# Patient Record
Sex: MALE | Race: WHITE | NOT HISPANIC OR LATINO | Employment: FULL TIME | URBAN - METROPOLITAN AREA
[De-identification: names, ages, dates, MRNs, and addresses within clinical notes are randomized per-mention and may not be internally consistent; named-entity substitution may affect disease eponyms.]

---

## 2018-11-07 ENCOUNTER — OFFICE VISIT (OUTPATIENT)
Dept: NEUROSURGERY | Facility: CLINIC | Age: 43
End: 2018-11-07
Payer: COMMERCIAL

## 2018-11-07 VITALS
DIASTOLIC BLOOD PRESSURE: 92 MMHG | SYSTOLIC BLOOD PRESSURE: 137 MMHG | TEMPERATURE: 99 F | HEART RATE: 100 BPM | WEIGHT: 205 LBS | HEIGHT: 72 IN | BODY MASS INDEX: 27.77 KG/M2

## 2018-11-07 DIAGNOSIS — F32.A DEPRESSION, UNSPECIFIED DEPRESSION TYPE: ICD-10-CM

## 2018-11-07 DIAGNOSIS — M51.34 DDD (DEGENERATIVE DISC DISEASE), THORACIC: Primary | ICD-10-CM

## 2018-11-07 DIAGNOSIS — M54.9 MID BACK PAIN: ICD-10-CM

## 2018-11-07 DIAGNOSIS — G89.4 CHRONIC PAIN SYNDROME: ICD-10-CM

## 2018-11-07 PROCEDURE — 99999 PR PBB SHADOW E&M-EST. PATIENT-LVL III: CPT | Mod: PBBFAC,,, | Performed by: NEUROLOGICAL SURGERY

## 2018-11-07 PROCEDURE — 3008F BODY MASS INDEX DOCD: CPT | Mod: CPTII,S$GLB,, | Performed by: NEUROLOGICAL SURGERY

## 2018-11-07 PROCEDURE — 99205 OFFICE O/P NEW HI 60 MIN: CPT | Mod: S$GLB,,, | Performed by: NEUROLOGICAL SURGERY

## 2018-11-07 RX ORDER — FLUOXETINE HYDROCHLORIDE 20 MG/1
20 CAPSULE ORAL DAILY
COMMUNITY
End: 2023-04-07

## 2018-11-07 RX ORDER — ARIPIPRAZOLE 2 MG/1
2 TABLET ORAL DAILY
COMMUNITY
End: 2021-04-11 | Stop reason: SDUPTHER

## 2018-11-07 RX ORDER — HYDROCODONE BITARTRATE AND ACETAMINOPHEN 10; 325 MG/1; MG/1
1 TABLET ORAL EVERY 12 HOURS PRN
COMMUNITY

## 2018-11-07 NOTE — LETTER
November 7, 2018      Ann-Marie Smith MD  [address]  [facility] [address]           [place] [facility]  [address] [facility] [address] [place] [address]  Phone: [phone]  Fax: [phone]          Patient: Raúl Hastings   MR Number: [id number]   YOB: 1975   Date of Visit: 11/7/2018       Dear Dr. Ann-Marie Smith:    Thank you for referring Raúl Hastings to me for evaluation. Attached you will find relevant portions of my assessment and plan of care.    If you have questions, please do not hesitate to call me. I look forward to following Raúl Hastings along with you.    Sincerely,    Logan Christina MD    Enclosure  CC:  No Recipients    If you would like to receive this communication electronically, please contact [email or web address] or [phone] to request more information on [facility] access.    For providers and/or their staff who would like to refer a patient to [facility], please contact us through our one-stop-shop provider referral line, [facility] , at [phone].    If you feel you have received this communication in error or would no longer like to receive these types of communications, please e-mail [email or web address]

## 2018-11-07 NOTE — PROGRESS NOTES
Neurosurgery Outpatient Follow Up    Patient ID: Raúl Hastings is a 43 y.o. male.    No chief complaint on file.          Review of Systems   Constitutional: Negative for activity change, appetite change, chills, fever and unexpected weight change.   HENT: Negative for tinnitus, trouble swallowing and voice change.    Respiratory: Negative for apnea, cough, chest tightness and shortness of breath.    Cardiovascular: Negative for chest pain and palpitations.   Gastrointestinal: Negative for constipation, diarrhea, nausea and vomiting.   Genitourinary: Negative for difficulty urinating, dysuria, frequency and urgency.   Musculoskeletal: Positive for arthralgias, back pain and myalgias. Negative for gait problem, neck pain and neck stiffness.   Skin: Negative for wound.   Neurological: Negative for dizziness, tremors, seizures, facial asymmetry, speech difficulty, weakness, light-headedness, numbness and headaches.   Psychiatric/Behavioral: Positive for dysphoric mood and sleep disturbance. Negative for confusion and decreased concentration.       Past Medical History:   Diagnosis Date    Degenerated intervertebral disc      Social History     Socioeconomic History    Marital status:      Spouse name: Not on file    Number of children: Not on file    Years of education: Not on file    Highest education level: Not on file   Social Needs    Financial resource strain: Not on file    Food insecurity - worry: Not on file    Food insecurity - inability: Not on file    Transportation needs - medical: Not on file    Transportation needs - non-medical: Not on file   Occupational History    Not on file   Tobacco Use    Smoking status: Never Smoker    Smokeless tobacco: Never Used   Substance and Sexual Activity    Alcohol use: No    Drug use: No    Sexual activity: Yes     Partners: Male, Female   Other Topics Concern    Not on file   Social History Narrative    Not on file     Family History   Family  history unknown: Yes     Review of patient's allergies indicates:  No Known Allergies    Current Outpatient Medications:     ARIPiprazole (ABILIFY) 2 MG Tab, Take 2 mg by mouth once daily., Disp: , Rfl:     FLUoxetine (PROZAC) 20 MG capsule, Take 20 mg by mouth once daily., Disp: , Rfl:     HYDROcodone-acetaminophen (NORCO)  mg per tablet, Take 1 tablet by mouth every 12 (twelve) hours as needed for Pain., Disp: , Rfl:     selegiline (EMSAM) 6 mg/24 hr, Place 1 patch onto the skin once daily., Disp: , Rfl:   Blood pressure (!) 137/92, pulse 100, temperature 98.8 °F (37.1 °C), height 6' (1.829 m), weight 93 kg (205 lb).      Neurologic Exam     Mental Status   Oriented to person, place, and time.   Speech: speech is normal     Cranial Nerves     CN III, IV, VI   Pupils are equal, round, and reactive to light.    Motor Exam     Strength   Strength 5/5 throughout.     Gait, Coordination, and Reflexes     Reflexes   Right brachioradialis: 2+  Left brachioradialis: 2+  Right biceps: 2+  Left biceps: 2+  Right triceps: 2+  Left triceps: 2+  Right patellar: 2+  Left patellar: 2+  Right achilles: 2+  Left achilles: 2+      Physical Exam   Constitutional: He is oriented to person, place, and time. He appears well-developed and well-nourished.   HENT:   Head: Normocephalic and atraumatic.   Eyes: Pupils are equal, round, and reactive to light.   Neck: Normal range of motion. Neck supple.   Cardiovascular: Normal pulses.   Pulmonary/Chest: Effort normal.   Musculoskeletal: Normal range of motion. He exhibits no edema.   Neurological: He is oriented to person, place, and time. He has normal strength. He displays no atrophy, no tremor and normal reflexes. No cranial nerve deficit or sensory deficit. He exhibits normal muscle tone. He displays no seizure activity. Coordination and gait normal. GCS eye subscore is 4. GCS verbal subscore is 5. GCS motor subscore is 6. He displays no Babinski's sign on the right side.    Reflex Scores:       Tricep reflexes are 2+ on the right side and 2+ on the left side.       Bicep reflexes are 2+ on the right side and 2+ on the left side.       Brachioradialis reflexes are 2+ on the right side and 2+ on the left side.       Patellar reflexes are 2+ on the right side and 2+ on the left side.       Achilles reflexes are 2+ on the right side and 2+ on the left side.  Skin: Skin is warm, dry and intact.   Psychiatric: He has a normal mood and affect. His speech is normal and behavior is normal. Judgment and thought content normal.   Nursing note and vitals reviewed.      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 He has seen Dr. Krueger  and underwent a laminectomy at right T11-12 and T12-L1 followed by multiple BRIEN for stenosis. He developed mid back pain in 2002 with radiation into the ribcage. The pain is worst in day when he sits and is relieved by laying down. There pain is alleviated by posture. He has regular BRIEN with diminishing duration of relief. He has undergone physical therapy with no significant changes.     His Oswestry disability index score is 42% and his PHQ screening is 27 indicating severe depression treated unsuccessfully with Abilify and Prozac.    On examination he is a well-appearing  male with no neurological deficits. He does have a depressed mood and anxious affect.     I have reviewed the recent thoracic and lumbar MRI which shows T11-12 and T12-L1 spondylosis with facet hypertrophy causing mild lateral recess stenosis bilaterally. There is also some degenerative disc disease at L5/S1, but rest of the spine looks quite healthy for his age.     I have showed the images to the patient and discussed the natural history, the pathology, and the treatment options. I believe his primary concern is mid back pain. Given his age, absence of clinical deficits and MRI findings the optimal treatment  would consist of psychiatric treatment. His disability index score is totally our of proportion with his mild spinal disease. However the spinal diagnosis is correct and he will benefits somewhat from a fusion since the symptoms are posture dependant. He can undergo redo decompression with wide facetectomies, reinforced with posterior fusion from T11 to L1.  Given his mental health however I am not optimistic of marked improvement and would demand an improved PHQ-9 score prior to consideration for surgery.    Visit Diagnosis:  DDD (degenerative disc disease), thoracic    Depression, unspecified depression type    Mid back pain    Chronic pain syndrome

## 2018-11-27 PROBLEM — E29.1 TESTICULAR HYPOFUNCTION: Status: ACTIVE | Noted: 2018-11-27

## 2021-03-09 DIAGNOSIS — G47.19 EXCESSIVE DAYTIME SLEEPINESS: ICD-10-CM

## 2021-03-09 DIAGNOSIS — R06.83 SNORING: ICD-10-CM

## 2021-03-09 DIAGNOSIS — G47.419 NARCOLEPSY: Primary | ICD-10-CM

## 2021-03-09 DIAGNOSIS — I10 HTN (HYPERTENSION): ICD-10-CM

## 2021-03-31 ENCOUNTER — OFFICE VISIT (OUTPATIENT)
Dept: PAIN MEDICINE | Facility: CLINIC | Age: 46
End: 2021-03-31
Payer: COMMERCIAL

## 2021-03-31 VITALS
HEART RATE: 83 BPM | DIASTOLIC BLOOD PRESSURE: 79 MMHG | TEMPERATURE: 98 F | WEIGHT: 231.06 LBS | SYSTOLIC BLOOD PRESSURE: 129 MMHG | HEIGHT: 72 IN | RESPIRATION RATE: 18 BRPM | OXYGEN SATURATION: 95 % | BODY MASS INDEX: 31.3 KG/M2

## 2021-03-31 DIAGNOSIS — G89.4 CHRONIC PAIN DISORDER: ICD-10-CM

## 2021-03-31 DIAGNOSIS — M51.34 DDD (DEGENERATIVE DISC DISEASE), THORACIC: Primary | ICD-10-CM

## 2021-03-31 DIAGNOSIS — M48.04 SPINAL STENOSIS, THORACIC REGION: ICD-10-CM

## 2021-03-31 DIAGNOSIS — M96.1 POSTLAMINECTOMY SYNDROME, THORACIC: ICD-10-CM

## 2021-03-31 DIAGNOSIS — M54.14 THORACIC RADICULOPATHY: ICD-10-CM

## 2021-03-31 PROCEDURE — 99999 PR PBB SHADOW E&M-EST. PATIENT-LVL IV: CPT | Mod: PBBFAC,,, | Performed by: ANESTHESIOLOGY

## 2021-03-31 PROCEDURE — 99204 OFFICE O/P NEW MOD 45 MIN: CPT | Mod: S$GLB,,, | Performed by: ANESTHESIOLOGY

## 2021-03-31 PROCEDURE — 99204 PR OFFICE/OUTPT VISIT, NEW, LEVL IV, 45-59 MIN: ICD-10-PCS | Mod: S$GLB,,, | Performed by: ANESTHESIOLOGY

## 2021-03-31 PROCEDURE — 3008F BODY MASS INDEX DOCD: CPT | Mod: CPTII,S$GLB,, | Performed by: ANESTHESIOLOGY

## 2021-03-31 PROCEDURE — 1125F AMNT PAIN NOTED PAIN PRSNT: CPT | Mod: S$GLB,,, | Performed by: ANESTHESIOLOGY

## 2021-03-31 PROCEDURE — 99999 PR PBB SHADOW E&M-EST. PATIENT-LVL IV: ICD-10-PCS | Mod: PBBFAC,,, | Performed by: ANESTHESIOLOGY

## 2021-03-31 PROCEDURE — 3008F PR BODY MASS INDEX (BMI) DOCUMENTED: ICD-10-PCS | Mod: CPTII,S$GLB,, | Performed by: ANESTHESIOLOGY

## 2021-03-31 PROCEDURE — 1125F PR PAIN SEVERITY QUANTIFIED, PAIN PRESENT: ICD-10-PCS | Mod: S$GLB,,, | Performed by: ANESTHESIOLOGY

## 2021-03-31 RX ORDER — CICLOPIROX 1 G/100ML
SHAMPOO TOPICAL
COMMUNITY
End: 2023-04-07

## 2021-03-31 RX ORDER — VALACYCLOVIR HYDROCHLORIDE 1 G/1
TABLET, FILM COATED ORAL
COMMUNITY

## 2021-03-31 RX ORDER — ARIPIPRAZOLE 2 MG/1
1 TABLET ORAL DAILY
COMMUNITY

## 2021-03-31 RX ORDER — DEXTROAMPHETAMINE SULFATE, DEXTROAMPHETAMINE SACCHARATE, AMPHETAMINE SULFATE AND AMPHETAMINE ASPARTATE 5; 5; 5; 5 MG/1; MG/1; MG/1; MG/1
CAPSULE, EXTENDED RELEASE ORAL 2 TIMES DAILY
COMMUNITY
Start: 2021-01-06

## 2021-03-31 RX ORDER — DILTIAZEM HYDROCHLORIDE 240 MG/1
240 CAPSULE, COATED, EXTENDED RELEASE ORAL DAILY
COMMUNITY
Start: 2021-03-23 | End: 2023-04-07

## 2021-03-31 RX ORDER — SILDENAFIL CITRATE 20 MG/1
TABLET ORAL
COMMUNITY
Start: 2021-02-28

## 2021-03-31 RX ORDER — ARIPIPRAZOLE 2 MG/1
1 TABLET ORAL DAILY
COMMUNITY
End: 2021-04-11 | Stop reason: SDUPTHER

## 2021-03-31 RX ORDER — HYDROCODONE POLISTIREX AND CHLORPHENIRAMINE POLISTIREX 10; 8 MG/5ML; MG/5ML
SUSPENSION, EXTENDED RELEASE ORAL
COMMUNITY
End: 2023-04-07

## 2021-03-31 RX ORDER — AZELASTINE 1 MG/ML
SPRAY, METERED NASAL
COMMUNITY
End: 2023-04-07

## 2021-04-12 ENCOUNTER — TELEPHONE (OUTPATIENT)
Dept: PAIN MEDICINE | Facility: CLINIC | Age: 46
End: 2021-04-12

## 2021-04-26 ENCOUNTER — TELEPHONE (OUTPATIENT)
Dept: PAIN MEDICINE | Facility: CLINIC | Age: 46
End: 2021-04-26

## 2021-04-29 ENCOUNTER — PATIENT MESSAGE (OUTPATIENT)
Dept: RESEARCH | Facility: HOSPITAL | Age: 46
End: 2021-04-29

## 2021-04-29 ENCOUNTER — TELEPHONE (OUTPATIENT)
Dept: PAIN MEDICINE | Facility: CLINIC | Age: 46
End: 2021-04-29

## 2021-09-07 ENCOUNTER — TELEPHONE (OUTPATIENT)
Dept: SLEEP MEDICINE | Facility: OTHER | Age: 46
End: 2021-09-07

## 2021-09-19 ENCOUNTER — CLINICAL SUPPORT (OUTPATIENT)
Dept: URGENT CARE | Facility: CLINIC | Age: 46
End: 2021-09-19
Payer: COMMERCIAL

## 2021-09-19 DIAGNOSIS — Z11.52 ENCOUNTER FOR SCREENING LABORATORY TESTING FOR COVID-19 VIRUS: Primary | ICD-10-CM

## 2021-09-19 PROCEDURE — U0005 INFEC AGEN DETEC AMPLI PROBE: HCPCS | Performed by: EMERGENCY MEDICINE

## 2021-09-19 PROCEDURE — 99211 OFF/OP EST MAY X REQ PHY/QHP: CPT | Mod: S$GLB,CS,, | Performed by: EMERGENCY MEDICINE

## 2021-09-19 PROCEDURE — U0003 INFECTIOUS AGENT DETECTION BY NUCLEIC ACID (DNA OR RNA); SEVERE ACUTE RESPIRATORY SYNDROME CORONAVIRUS 2 (SARS-COV-2) (CORONAVIRUS DISEASE [COVID-19]), AMPLIFIED PROBE TECHNIQUE, MAKING USE OF HIGH THROUGHPUT TECHNOLOGIES AS DESCRIBED BY CMS-2020-01-R: HCPCS | Performed by: EMERGENCY MEDICINE

## 2021-09-19 PROCEDURE — 99211 PR OFFICE/OUTPT VISIT, EST, LEVL I: ICD-10-PCS | Mod: S$GLB,CS,, | Performed by: EMERGENCY MEDICINE

## 2021-09-20 ENCOUNTER — TELEPHONE (OUTPATIENT)
Dept: URGENT CARE | Facility: CLINIC | Age: 46
End: 2021-09-20

## 2021-09-20 LAB
SARS-COV-2 RNA RESP QL NAA+PROBE: NOT DETECTED
SARS-COV-2- CYCLE NUMBER: NORMAL

## 2021-09-21 ENCOUNTER — HOSPITAL ENCOUNTER (OUTPATIENT)
Dept: SLEEP MEDICINE | Facility: OTHER | Age: 46
Discharge: HOME | End: 2021-09-21
Attending: PSYCHIATRY & NEUROLOGY
Payer: COMMERCIAL

## 2021-09-21 DIAGNOSIS — G47.419 NARCOLEPSY: ICD-10-CM

## 2021-09-21 DIAGNOSIS — G47.33 OSA (OBSTRUCTIVE SLEEP APNEA): Primary | ICD-10-CM

## 2021-09-21 PROCEDURE — 95810 POLYSOM 6/> YRS 4/> PARAM: CPT | Mod: 26,,, | Performed by: INTERNAL MEDICINE

## 2021-09-21 PROCEDURE — 95810 PR POLYSOMNOGRAPHY, 4 OR MORE: ICD-10-PCS | Mod: 26,,, | Performed by: INTERNAL MEDICINE

## 2021-09-21 PROCEDURE — 95810 POLYSOM 6/> YRS 4/> PARAM: CPT

## 2024-04-29 ENCOUNTER — HOSPITAL ENCOUNTER (INPATIENT)
Facility: HOSPITAL | Age: 49
LOS: 1 days | Discharge: HOME | DRG: 887 | End: 2024-05-02
Attending: STUDENT IN AN ORGANIZED HEALTH CARE EDUCATION/TRAINING PROGRAM | Admitting: HOSPITALIST
Payer: COMMERCIAL

## 2024-04-29 DIAGNOSIS — R79.89 ACTH ELEVATION: ICD-10-CM

## 2024-04-29 DIAGNOSIS — R42 DIZZINESS: ICD-10-CM

## 2024-04-29 DIAGNOSIS — I95.9 HYPOTENSION: ICD-10-CM

## 2024-04-29 DIAGNOSIS — R51.9 NONINTRACTABLE HEADACHE, UNSPECIFIED CHRONICITY PATTERN, UNSPECIFIED HEADACHE TYPE: Primary | ICD-10-CM

## 2024-04-29 DIAGNOSIS — R53.1 WEAKNESS: ICD-10-CM

## 2024-04-29 DIAGNOSIS — R07.9 CHEST PAIN: ICD-10-CM

## 2024-04-29 PROCEDURE — 80053 COMPREHEN METABOLIC PANEL: CPT | Performed by: STUDENT IN AN ORGANIZED HEALTH CARE EDUCATION/TRAINING PROGRAM

## 2024-04-29 PROCEDURE — 99285 EMERGENCY DEPT VISIT HI MDM: CPT | Mod: 25

## 2024-04-29 PROCEDURE — 82533 TOTAL CORTISOL: CPT | Performed by: STUDENT IN AN ORGANIZED HEALTH CARE EDUCATION/TRAINING PROGRAM

## 2024-04-29 PROCEDURE — 84443 ASSAY THYROID STIM HORMONE: CPT | Performed by: STUDENT IN AN ORGANIZED HEALTH CARE EDUCATION/TRAINING PROGRAM

## 2024-04-29 PROCEDURE — 93010 ELECTROCARDIOGRAM REPORT: CPT | Mod: ,,, | Performed by: GENERAL PRACTICE

## 2024-04-29 PROCEDURE — 83880 ASSAY OF NATRIURETIC PEPTIDE: CPT | Performed by: STUDENT IN AN ORGANIZED HEALTH CARE EDUCATION/TRAINING PROGRAM

## 2024-04-29 PROCEDURE — 84484 ASSAY OF TROPONIN QUANT: CPT | Performed by: STUDENT IN AN ORGANIZED HEALTH CARE EDUCATION/TRAINING PROGRAM

## 2024-04-29 PROCEDURE — 82024 ASSAY OF ACTH: CPT | Performed by: STUDENT IN AN ORGANIZED HEALTH CARE EDUCATION/TRAINING PROGRAM

## 2024-04-29 PROCEDURE — 93005 ELECTROCARDIOGRAM TRACING: CPT | Performed by: GENERAL PRACTICE

## 2024-04-29 PROCEDURE — 83605 ASSAY OF LACTIC ACID: CPT | Performed by: STUDENT IN AN ORGANIZED HEALTH CARE EDUCATION/TRAINING PROGRAM

## 2024-04-29 PROCEDURE — 85025 COMPLETE CBC W/AUTO DIFF WBC: CPT | Performed by: STUDENT IN AN ORGANIZED HEALTH CARE EDUCATION/TRAINING PROGRAM

## 2024-04-30 ENCOUNTER — CLINICAL SUPPORT (OUTPATIENT)
Dept: CARDIOLOGY | Facility: HOSPITAL | Age: 49
DRG: 887 | End: 2024-04-30
Attending: HOSPITALIST
Payer: COMMERCIAL

## 2024-04-30 VITALS — BODY MASS INDEX: 28.6 KG/M2 | HEIGHT: 72 IN | WEIGHT: 211.19 LBS

## 2024-04-30 PROBLEM — E86.1 HYPOTENSION DUE TO HYPOVOLEMIA: Status: ACTIVE | Noted: 2024-04-30

## 2024-04-30 PROBLEM — N17.9 AKI (ACUTE KIDNEY INJURY): Status: ACTIVE | Noted: 2024-04-30

## 2024-04-30 PROBLEM — G47.10 HYPERSOMNOLENCE: Status: ACTIVE | Noted: 2024-04-30

## 2024-04-30 PROBLEM — R53.83 FATIGUE: Status: ACTIVE | Noted: 2024-04-30

## 2024-04-30 LAB
ALBUMIN SERPL BCP-MCNC: 4.2 G/DL (ref 3.5–5.2)
ALP SERPL-CCNC: 47 U/L (ref 55–135)
ALT SERPL W/O P-5'-P-CCNC: 17 U/L (ref 10–44)
AMPHET+METHAMPHET UR QL: ABNORMAL
ANION GAP SERPL CALC-SCNC: 5 MMOL/L (ref 8–16)
ANION GAP SERPL CALC-SCNC: 7 MMOL/L (ref 8–16)
AORTIC ROOT ANNULUS: 3.5 CM
AORTIC VALVE CUSP SEPERATION: 2.1 CM
ASCENDING AORTA: 3.5 CM
AST SERPL-CCNC: 17 U/L (ref 10–40)
AV INDEX (PROSTH): 0.83
AV MEAN GRADIENT: 6 MMHG
AV PEAK GRADIENT: 10 MMHG
AV VALVE AREA BY VELOCITY RATIO: 3.34 CM²
AV VALVE AREA: 3.43 CM²
AV VELOCITY RATIO: 0.81
BARBITURATES UR QL SCN>200 NG/ML: NEGATIVE
BASOPHILS # BLD AUTO: 0.03 K/UL (ref 0–0.2)
BASOPHILS # BLD AUTO: 0.03 K/UL (ref 0–0.2)
BASOPHILS NFR BLD: 0.2 % (ref 0–1.9)
BASOPHILS NFR BLD: 0.4 % (ref 0–1.9)
BENZODIAZ UR QL SCN>200 NG/ML: ABNORMAL
BILIRUB SERPL-MCNC: 0.8 MG/DL (ref 0.1–1)
BILIRUB UR QL STRIP: NEGATIVE
BNP SERPL-MCNC: 24 PG/ML (ref 0–99)
BSA FOR ECHO PROCEDURE: 2.21 M2
BUN SERPL-MCNC: 15 MG/DL (ref 6–20)
BUN SERPL-MCNC: 18 MG/DL (ref 6–20)
BZE UR QL SCN: NEGATIVE
CALCIUM SERPL-MCNC: 8.3 MG/DL (ref 8.7–10.5)
CALCIUM SERPL-MCNC: 8.8 MG/DL (ref 8.7–10.5)
CANNABINOIDS UR QL SCN: NEGATIVE
CHLORIDE SERPL-SCNC: 102 MMOL/L (ref 95–110)
CHLORIDE SERPL-SCNC: 104 MMOL/L (ref 95–110)
CLARITY UR: CLEAR
CO2 SERPL-SCNC: 27 MMOL/L (ref 23–29)
CO2 SERPL-SCNC: 28 MMOL/L (ref 23–29)
COLOR UR: YELLOW
CORTIS SERPL-MCNC: 14.3 UG/DL
CORTIS SERPL-MCNC: 3.5 UG/DL
CREAT SERPL-MCNC: 1.3 MG/DL (ref 0.5–1.4)
CREAT SERPL-MCNC: 1.8 MG/DL (ref 0.5–1.4)
CREAT UR-MCNC: 82 MG/DL (ref 23–375)
CV ECHO LV RWT: 0.37 CM
DIFFERENTIAL METHOD BLD: ABNORMAL
DIFFERENTIAL METHOD BLD: ABNORMAL
DOP CALC AO PEAK VEL: 1.59 M/S
DOP CALC AO VTI: 29.8 CM
DOP CALC LVOT AREA: 4.2 CM2
DOP CALC LVOT DIAMETER: 2.3 CM
DOP CALC LVOT PEAK VEL: 1.28 M/S
DOP CALC LVOT STROKE VOLUME: 102.16 CM3
DOP CALC MV VTI: 25.9 CM
DOP CALCLVOT PEAK VEL VTI: 24.6 CM
E WAVE DECELERATION TIME: 201 MSEC
E/A RATIO: 1.14
E/E' RATIO: 7.36 M/S
ECHO LV POSTERIOR WALL: 1 CM (ref 0.6–1.1)
EOSINOPHIL # BLD AUTO: 0 K/UL (ref 0–0.5)
EOSINOPHIL # BLD AUTO: 0 K/UL (ref 0–0.5)
EOSINOPHIL NFR BLD: 0 % (ref 0–8)
EOSINOPHIL NFR BLD: 0 % (ref 0–8)
ERYTHROCYTE [DISTWIDTH] IN BLOOD BY AUTOMATED COUNT: 12.5 % (ref 11.5–14.5)
ERYTHROCYTE [DISTWIDTH] IN BLOOD BY AUTOMATED COUNT: 12.5 % (ref 11.5–14.5)
EST. GFR  (NO RACE VARIABLE): 45.6 ML/MIN/1.73 M^2
EST. GFR  (NO RACE VARIABLE): >60 ML/MIN/1.73 M^2
ESTIMATED AVG GLUCOSE: 91 MG/DL (ref 68–131)
FRACTIONAL SHORTENING: 39 % (ref 28–44)
GLUCOSE SERPL-MCNC: 102 MG/DL (ref 70–110)
GLUCOSE SERPL-MCNC: 117 MG/DL (ref 70–110)
GLUCOSE UR QL STRIP: NEGATIVE
HBA1C MFR BLD: 4.8 % (ref 4.5–6.2)
HCT VFR BLD AUTO: 35.3 % (ref 40–54)
HCT VFR BLD AUTO: 39.6 % (ref 40–54)
HGB BLD-MCNC: 12.4 G/DL (ref 14–18)
HGB BLD-MCNC: 14 G/DL (ref 14–18)
HGB UR QL STRIP: NEGATIVE
IMM GRANULOCYTES # BLD AUTO: 0.05 K/UL (ref 0–0.04)
IMM GRANULOCYTES # BLD AUTO: 0.06 K/UL (ref 0–0.04)
IMM GRANULOCYTES NFR BLD AUTO: 0.4 % (ref 0–0.5)
IMM GRANULOCYTES NFR BLD AUTO: 0.6 % (ref 0–0.5)
INTERVENTRICULAR SEPTUM: 0.9 CM (ref 0.6–1.1)
IVC DIAMETER: 1.8 CM
KETONES UR QL STRIP: NEGATIVE
LACTATE SERPL-SCNC: 1.5 MMOL/L (ref 0.5–1.9)
LEFT ATRIUM SIZE: 4 CM
LEFT INTERNAL DIMENSION IN SYSTOLE: 3.3 CM (ref 2.1–4)
LEFT VENTRICLE DIASTOLIC VOLUME INDEX: 64.68 ML/M2
LEFT VENTRICLE DIASTOLIC VOLUME: 141 ML
LEFT VENTRICLE MASS INDEX: 89 G/M2
LEFT VENTRICLE SYSTOLIC VOLUME INDEX: 20.2 ML/M2
LEFT VENTRICLE SYSTOLIC VOLUME: 44.1 ML
LEFT VENTRICULAR INTERNAL DIMENSION IN DIASTOLE: 5.4 CM (ref 3.5–6)
LEFT VENTRICULAR MASS: 193.25 G
LEUKOCYTE ESTERASE UR QL STRIP: NEGATIVE
LV LATERAL E/E' RATIO: 6.13 M/S
LV SEPTAL E/E' RATIO: 9.2 M/S
LVOT MG: 3 MMHG
LVOT MV: 0.85 CM/S
LYMPHOCYTES # BLD AUTO: 1.1 K/UL (ref 1–4.8)
LYMPHOCYTES # BLD AUTO: 1.4 K/UL (ref 1–4.8)
LYMPHOCYTES NFR BLD: 18.1 % (ref 18–48)
LYMPHOCYTES NFR BLD: 8.2 % (ref 18–48)
MCH RBC QN AUTO: 30.7 PG (ref 27–31)
MCH RBC QN AUTO: 31.2 PG (ref 27–31)
MCHC RBC AUTO-ENTMCNC: 35.1 G/DL (ref 32–36)
MCHC RBC AUTO-ENTMCNC: 35.4 G/DL (ref 32–36)
MCV RBC AUTO: 87 FL (ref 82–98)
MCV RBC AUTO: 88 FL (ref 82–98)
MONOCYTES # BLD AUTO: 0.8 K/UL (ref 0.3–1)
MONOCYTES # BLD AUTO: 0.8 K/UL (ref 0.3–1)
MONOCYTES NFR BLD: 10.3 % (ref 4–15)
MONOCYTES NFR BLD: 5.5 % (ref 4–15)
MV MEAN GRADIENT: 2 MMHG
MV PEAK A VEL: 0.81 M/S
MV PEAK E VEL: 0.92 M/S
MV PEAK GRADIENT: 3 MMHG
MV STENOSIS PRESSURE HALF TIME: 62 MS
MV VALVE AREA BY CONTINUITY EQUATION: 3.94 CM2
MV VALVE AREA P 1/2 METHOD: 3.55 CM2
NEUTROPHILS # BLD AUTO: 11.7 K/UL (ref 1.8–7.7)
NEUTROPHILS # BLD AUTO: 5.6 K/UL (ref 1.8–7.7)
NEUTROPHILS NFR BLD: 70.6 % (ref 38–73)
NEUTROPHILS NFR BLD: 85.7 % (ref 38–73)
NITRITE UR QL STRIP: NEGATIVE
NRBC BLD-RTO: 0 /100 WBC
NRBC BLD-RTO: 0 /100 WBC
OHS CV RV/LV RATIO: 0.78 CM
OHS LV EJECTION FRACTION SIMPSONS BIPLANE MOD: 67 %
OPIATES UR QL SCN: NEGATIVE
PCP UR QL SCN>25 NG/ML: NEGATIVE
PH UR STRIP: 6 [PH] (ref 5–8)
PISA MRMAX VEL: 5.09 M/S
PISA TR MAX VEL: 2.75 M/S
PLATELET # BLD AUTO: 207 K/UL (ref 150–450)
PLATELET # BLD AUTO: 263 K/UL (ref 150–450)
PMV BLD AUTO: 10.1 FL (ref 9.2–12.9)
PMV BLD AUTO: 10.1 FL (ref 9.2–12.9)
POTASSIUM SERPL-SCNC: 3.9 MMOL/L (ref 3.5–5.1)
POTASSIUM SERPL-SCNC: 4.3 MMOL/L (ref 3.5–5.1)
PROCALCITONIN SERPL IA-MCNC: 0.08 NG/ML (ref 0–0.5)
PROT SERPL-MCNC: 6.5 G/DL (ref 6–8.4)
PROT UR QL STRIP: NEGATIVE
PV MV: 1 M/S
PV PEAK GRADIENT: 8 MMHG
PV PEAK VELOCITY: 1.4 M/S
RA PRESSURE ESTIMATED: 3 MMHG
RBC # BLD AUTO: 4.04 M/UL (ref 4.6–6.2)
RBC # BLD AUTO: 4.49 M/UL (ref 4.6–6.2)
RIGHT VENTRICULAR END-DIASTOLIC DIMENSION: 4.2 CM
RV TB RVSP: 6 MMHG
RV TISSUE DOPPLER FREE WALL SYSTOLIC VELOCITY 1 (APICAL 4 CHAMBER VIEW): 22.4 CM/S
SINUS: 3.6 CM
SODIUM SERPL-SCNC: 136 MMOL/L (ref 136–145)
SODIUM SERPL-SCNC: 137 MMOL/L (ref 136–145)
SP GR UR STRIP: 1.01 (ref 1–1.03)
TDI LATERAL: 0.15 M/S
TDI SEPTAL: 0.1 M/S
TDI: 0.13 M/S
TOXICOLOGY INFORMATION: ABNORMAL
TR MAX PG: 30 MMHG
TRICUSPID ANNULAR PLANE SYSTOLIC EXCURSION: 2.63 CM
TROPONIN I SERPL HS-MCNC: <2.3 PG/ML (ref 0–14.9)
TSH SERPL DL<=0.005 MIU/L-ACNC: 0.86 UIU/ML (ref 0.34–5.6)
TV REST PULMONARY ARTERY PRESSURE: 33 MMHG
URN SPEC COLLECT METH UR: NORMAL
UROBILINOGEN UR STRIP-ACNC: NEGATIVE EU/DL
WBC # BLD AUTO: 13.63 K/UL (ref 3.9–12.7)
WBC # BLD AUTO: 7.97 K/UL (ref 3.9–12.7)
Z-SCORE OF LEFT VENTRICULAR DIMENSION IN END DIASTOLE: -3
Z-SCORE OF LEFT VENTRICULAR DIMENSION IN END SYSTOLE: -2.33

## 2024-04-30 PROCEDURE — G0378 HOSPITAL OBSERVATION PER HR: HCPCS

## 2024-04-30 PROCEDURE — 80048 BASIC METABOLIC PNL TOTAL CA: CPT | Performed by: HOSPITALIST

## 2024-04-30 PROCEDURE — 25000003 PHARM REV CODE 250: Performed by: HOSPITALIST

## 2024-04-30 PROCEDURE — 87340 HEPATITIS B SURFACE AG IA: CPT | Performed by: HOSPITALIST

## 2024-04-30 PROCEDURE — 85025 COMPLETE CBC W/AUTO DIFF WBC: CPT | Performed by: HOSPITALIST

## 2024-04-30 PROCEDURE — 93306 TTE W/DOPPLER COMPLETE: CPT | Mod: 26,,, | Performed by: INTERNAL MEDICINE

## 2024-04-30 PROCEDURE — 82533 TOTAL CORTISOL: CPT | Performed by: HOSPITALIST

## 2024-04-30 PROCEDURE — 25500020 PHARM REV CODE 255: Performed by: HOSPITALIST

## 2024-04-30 PROCEDURE — A9585 GADOBUTROL INJECTION: HCPCS | Performed by: HOSPITALIST

## 2024-04-30 PROCEDURE — 86803 HEPATITIS C AB TEST: CPT | Performed by: HOSPITALIST

## 2024-04-30 PROCEDURE — 84402 ASSAY OF FREE TESTOSTERONE: CPT | Performed by: HOSPITALIST

## 2024-04-30 PROCEDURE — 94761 N-INVAS EAR/PLS OXIMETRY MLT: CPT

## 2024-04-30 PROCEDURE — 96361 HYDRATE IV INFUSION ADD-ON: CPT

## 2024-04-30 PROCEDURE — 87389 HIV-1 AG W/HIV-1&-2 AB AG IA: CPT | Performed by: HOSPITALIST

## 2024-04-30 PROCEDURE — 81003 URINALYSIS AUTO W/O SCOPE: CPT | Mod: 59 | Performed by: STUDENT IN AN ORGANIZED HEALTH CARE EDUCATION/TRAINING PROGRAM

## 2024-04-30 PROCEDURE — 93306 TTE W/DOPPLER COMPLETE: CPT

## 2024-04-30 PROCEDURE — 80307 DRUG TEST PRSMV CHEM ANLYZR: CPT | Performed by: HOSPITALIST

## 2024-04-30 PROCEDURE — 99900035 HC TECH TIME PER 15 MIN (STAT)

## 2024-04-30 PROCEDURE — 36415 COLL VENOUS BLD VENIPUNCTURE: CPT | Performed by: HOSPITALIST

## 2024-04-30 PROCEDURE — 25000003 PHARM REV CODE 250: Performed by: STUDENT IN AN ORGANIZED HEALTH CARE EDUCATION/TRAINING PROGRAM

## 2024-04-30 PROCEDURE — 83036 HEMOGLOBIN GLYCOSYLATED A1C: CPT | Performed by: HOSPITALIST

## 2024-04-30 PROCEDURE — 84145 PROCALCITONIN (PCT): CPT | Performed by: HOSPITALIST

## 2024-04-30 PROCEDURE — 87040 BLOOD CULTURE FOR BACTERIA: CPT | Performed by: HOSPITALIST

## 2024-04-30 RX ORDER — HYDROCODONE BITARTRATE AND ACETAMINOPHEN 5; 325 MG/1; MG/1
1 TABLET ORAL EVERY 6 HOURS PRN
Status: DISCONTINUED | OUTPATIENT
Start: 2024-04-30 | End: 2024-05-02 | Stop reason: HOSPADM

## 2024-04-30 RX ORDER — ALUMINUM HYDROXIDE, MAGNESIUM HYDROXIDE, AND SIMETHICONE 1200; 120; 1200 MG/30ML; MG/30ML; MG/30ML
30 SUSPENSION ORAL 4 TIMES DAILY PRN
Status: DISCONTINUED | OUTPATIENT
Start: 2024-04-30 | End: 2024-05-02 | Stop reason: HOSPADM

## 2024-04-30 RX ORDER — SODIUM,POTASSIUM PHOSPHATES 280-250MG
2 POWDER IN PACKET (EA) ORAL
Status: DISCONTINUED | OUTPATIENT
Start: 2024-04-30 | End: 2024-05-02 | Stop reason: HOSPADM

## 2024-04-30 RX ORDER — AMOXICILLIN 250 MG
1 CAPSULE ORAL 2 TIMES DAILY
Status: DISCONTINUED | OUTPATIENT
Start: 2024-04-30 | End: 2024-04-30

## 2024-04-30 RX ORDER — LANOLIN ALCOHOL/MO/W.PET/CERES
800 CREAM (GRAM) TOPICAL
Status: DISCONTINUED | OUTPATIENT
Start: 2024-04-30 | End: 2024-05-02 | Stop reason: HOSPADM

## 2024-04-30 RX ORDER — GLUCAGON 1 MG
1 KIT INJECTION
Status: DISCONTINUED | OUTPATIENT
Start: 2024-04-30 | End: 2024-05-02 | Stop reason: HOSPADM

## 2024-04-30 RX ORDER — ESCITALOPRAM OXALATE 10 MG/1
20 TABLET ORAL DAILY
Status: DISCONTINUED | OUTPATIENT
Start: 2024-04-30 | End: 2024-05-02 | Stop reason: HOSPADM

## 2024-04-30 RX ORDER — BUPRENORPHINE HYDROCHLORIDE 600 UG/1
1 FILM, SOLUBLE BUCCAL 2 TIMES DAILY
COMMUNITY
Start: 2024-04-23

## 2024-04-30 RX ORDER — DEXTROMETHORPHAN HYDROBROMIDE, BUPROPION HYDROCHLORIDE 105; 45 MG/1; MG/1
1 TABLET, MULTILAYER, EXTENDED RELEASE ORAL 2 TIMES DAILY
COMMUNITY
Start: 2024-04-15

## 2024-04-30 RX ORDER — ONDANSETRON HYDROCHLORIDE 2 MG/ML
4 INJECTION, SOLUTION INTRAVENOUS EVERY 6 HOURS PRN
Status: DISCONTINUED | OUTPATIENT
Start: 2024-04-30 | End: 2024-05-02 | Stop reason: HOSPADM

## 2024-04-30 RX ORDER — TADALAFIL 20 MG/1
20 TABLET ORAL DAILY PRN
COMMUNITY
Start: 2024-03-16

## 2024-04-30 RX ORDER — SODIUM CHLORIDE 9 MG/ML
INJECTION, SOLUTION INTRAVENOUS CONTINUOUS
Status: DISCONTINUED | OUTPATIENT
Start: 2024-04-30 | End: 2024-05-02 | Stop reason: HOSPADM

## 2024-04-30 RX ORDER — SODIUM CHLORIDE 0.9 % (FLUSH) 0.9 %
10 SYRINGE (ML) INJECTION EVERY 12 HOURS PRN
Status: DISCONTINUED | OUTPATIENT
Start: 2024-04-30 | End: 2024-05-02 | Stop reason: HOSPADM

## 2024-04-30 RX ORDER — CARIPRAZINE 3 MG/1
3 CAPSULE, GELATIN COATED ORAL DAILY
Status: ON HOLD | COMMUNITY
Start: 2024-03-28 | End: 2024-04-30

## 2024-04-30 RX ORDER — ONDANSETRON HYDROCHLORIDE 2 MG/ML
INJECTION, SOLUTION INTRAVENOUS
Status: DISCONTINUED
Start: 2024-04-30 | End: 2024-04-30 | Stop reason: WASHOUT

## 2024-04-30 RX ORDER — DEXTROAMPHETAMINE SACCHARATE, AMPHETAMINE ASPARTATE, DEXTROAMPHETAMINE SULFATE AND AMPHETAMINE SULFATE 7.5; 7.5; 7.5; 7.5 MG/1; MG/1; MG/1; MG/1
1 TABLET ORAL 2 TIMES DAILY
COMMUNITY

## 2024-04-30 RX ORDER — OXYCODONE HYDROCHLORIDE 5 MG/1
5 CAPSULE ORAL 2 TIMES DAILY PRN
Status: ON HOLD | COMMUNITY
End: 2024-04-30 | Stop reason: CLARIF

## 2024-04-30 RX ORDER — GADOBUTROL 604.72 MG/ML
5 INJECTION INTRAVENOUS
Status: COMPLETED | OUTPATIENT
Start: 2024-04-30 | End: 2024-04-30

## 2024-04-30 RX ORDER — OXYCODONE HYDROCHLORIDE 5 MG/1
5 CAPSULE ORAL 2 TIMES DAILY PRN
Status: ON HOLD | COMMUNITY
End: 2024-04-30 | Stop reason: DRUGHIGH

## 2024-04-30 RX ORDER — IBUPROFEN 200 MG
24 TABLET ORAL
Status: DISCONTINUED | OUTPATIENT
Start: 2024-04-30 | End: 2024-05-02 | Stop reason: HOSPADM

## 2024-04-30 RX ORDER — IBUPROFEN 200 MG
16 TABLET ORAL
Status: DISCONTINUED | OUTPATIENT
Start: 2024-04-30 | End: 2024-05-02 | Stop reason: HOSPADM

## 2024-04-30 RX ORDER — TALC
3 POWDER (GRAM) TOPICAL NIGHTLY PRN
Status: DISCONTINUED | OUTPATIENT
Start: 2024-04-30 | End: 2024-05-02 | Stop reason: HOSPADM

## 2024-04-30 RX ORDER — METHYLPHENIDATE HYDROCHLORIDE 5 MG/1
5 TABLET ORAL 2 TIMES DAILY WITH MEALS
Status: DISCONTINUED | OUTPATIENT
Start: 2024-04-30 | End: 2024-04-30

## 2024-04-30 RX ORDER — ACETAMINOPHEN 325 MG/1
650 TABLET ORAL EVERY 8 HOURS PRN
Status: DISCONTINUED | OUTPATIENT
Start: 2024-04-30 | End: 2024-04-30

## 2024-04-30 RX ORDER — NALOXONE HCL 0.4 MG/ML
0.02 VIAL (ML) INJECTION
Status: DISCONTINUED | OUTPATIENT
Start: 2024-04-30 | End: 2024-05-02 | Stop reason: HOSPADM

## 2024-04-30 RX ORDER — PROCHLORPERAZINE EDISYLATE 5 MG/ML
5 INJECTION INTRAMUSCULAR; INTRAVENOUS EVERY 6 HOURS PRN
Status: DISCONTINUED | OUTPATIENT
Start: 2024-04-30 | End: 2024-05-02 | Stop reason: HOSPADM

## 2024-04-30 RX ORDER — OXYCODONE HYDROCHLORIDE 10 MG/1
10 TABLET ORAL EVERY 12 HOURS
COMMUNITY
Start: 2024-04-12

## 2024-04-30 RX ORDER — ARIPIPRAZOLE 2 MG/1
2 TABLET ORAL DAILY
Status: DISCONTINUED | OUTPATIENT
Start: 2024-04-30 | End: 2024-04-30

## 2024-04-30 RX ORDER — ACETAMINOPHEN 325 MG/1
650 TABLET ORAL EVERY 4 HOURS PRN
Status: DISCONTINUED | OUTPATIENT
Start: 2024-04-30 | End: 2024-05-02 | Stop reason: HOSPADM

## 2024-04-30 RX ADMIN — SODIUM CHLORIDE 1000 ML: 9 INJECTION, SOLUTION INTRAVENOUS at 12:04

## 2024-04-30 RX ADMIN — SODIUM CHLORIDE: 9 INJECTION, SOLUTION INTRAVENOUS at 11:04

## 2024-04-30 RX ADMIN — GADOBUTROL 5 ML: 604.72 INJECTION INTRAVENOUS at 06:04

## 2024-04-30 RX ADMIN — ALUMINUM HYDROXIDE, MAGNESIUM HYDROXIDE, AND SIMETHICONE 30 ML: 1200; 120; 1200 SUSPENSION ORAL at 08:04

## 2024-04-30 RX ADMIN — SODIUM CHLORIDE 1000 ML: 9 INJECTION, SOLUTION INTRAVENOUS at 10:04

## 2024-04-30 RX ADMIN — ESCITALOPRAM OXALATE 20 MG: 10 TABLET ORAL at 10:04

## 2024-04-30 RX ADMIN — IOHEXOL 100 ML: 350 INJECTION, SOLUTION INTRAVENOUS at 03:04

## 2024-04-30 RX ADMIN — ALUMINUM HYDROXIDE, MAGNESIUM HYDROXIDE, AND SIMETHICONE 30 ML: 1200; 120; 1200 SUSPENSION ORAL at 04:04

## 2024-04-30 NOTE — SUBJECTIVE & OBJECTIVE
Past Medical History:   Diagnosis Date    Degenerated intervertebral disc        Past Surgical History:   Procedure Laterality Date    BACK SURGERY      Laminectomy       Review of patient's allergies indicates:  No Known Allergies    Current Facility-Administered Medications   Medication Dose Route Frequency Provider Last Rate Last Admin    acetaminophen tablet 650 mg  650 mg Oral Q4H PRHumberto Minaya MD        aluminum-magnesium hydroxide-simethicone 200-200-20 mg/5 mL suspension 30 mL  30 mL Oral QID Humberto Lao MD        dextrose 50% injection 12.5 g  12.5 g Intravenous PRN Humberto Benson MD        dextrose 50% injection 25 g  25 g Intravenous PRN Humberto Benson MD        glucagon (human recombinant) injection 1 mg  1 mg Intramuscular PRHumberto Minaya MD        glucose chewable tablet 16 g  16 g Oral Humberto Lao MD        glucose chewable tablet 24 g  24 g Oral PRHumberto Minaya MD        HYDROcodone-acetaminophen 5-325 mg per tablet 1 tablet  1 tablet Oral Q6H Humberto Lao MD        magnesium oxide tablet 800 mg  800 mg Oral Humberto Lao MD        magnesium oxide tablet 800 mg  800 mg Oral PRHumberto Minaya MD        melatonin tablet 3 mg  3 mg Oral Nightly Humberto Lao MD        naloxone 0.4 mg/mL injection 0.02 mg  0.02 mg Intravenous Humberto Lao MD        ondansetron injection 4 mg  4 mg Intravenous Q6H PRHumberto Minaya MD        potassium bicarbonate disintegrating tablet 35 mEq  35 mEq Oral Humberto Lao MD        potassium bicarbonate disintegrating tablet 50 mEq  50 mEq Oral Humberto Lao MD        potassium bicarbonate disintegrating tablet 60 mEq  60 mEq Oral Humberto Lao MD        potassium, sodium phosphates 280-160-250 mg packet 2 packet  2 packet Oral PRN Humberto Benson MD        potassium, sodium phosphates 280-160-250 mg packet 2 packet  2 packet Oral PRN Humberto Benson MD        potassium, sodium phosphates  280-160-250 mg packet 2 packet  2 packet Oral PRN Humberto Benson MD        prochlorperazine injection Soln 5 mg  5 mg Intravenous Q6H PRN Humberto Benson MD        senna-docusate 8.6-50 mg per tablet 1 tablet  1 tablet Oral BID Humberto Benson MD        sodium chloride 0.9% flush 10 mL  10 mL Intravenous Q12H PRN Humberto Benson MD         Current Outpatient Medications   Medication Sig Dispense Refill    ADDERALL XR 20 mg 24 hr capsule Take by mouth 2 (two) times daily.      amLODIPine (NORVASC) 10 MG tablet Take 1 tablet (10 mg total) by mouth every evening. 30 tablet 11    ARIPiprazole (ABILIFY) 2 MG Tab Take 1 tablet by mouth once daily.      EScitalopram oxalate (LEXAPRO) 20 MG tablet Take 20 mg by mouth once daily.      HYDROcodone-acetaminophen (NORCO)  mg per tablet Take 1 tablet by mouth every 12 (twelve) hours as needed for Pain.      propranoloL (INDERAL) 20 MG tablet Take 20 mg by mouth.      sildenafil (REVATIO) 20 mg Tab TAKE 1 5 TABLETS 1 HOUR PRIOR TO INTERCOURSE.      valACYclovir (VALTREX) 1000 MG tablet Valtrex 1 gram tablet   Take 1 tablet 4 times a day by oral route.       Family History    Family history is unknown by patient.       Tobacco Use    Smoking status: Never    Smokeless tobacco: Never   Substance and Sexual Activity    Alcohol use: No    Drug use: No    Sexual activity: Yes     Partners: Male, Female     Review of Systems   Constitutional:  Positive for fatigue. Negative for chills and fever.   Eyes:  Negative for photophobia and visual disturbance.   Respiratory:  Negative for cough, chest tightness, shortness of breath and wheezing.    Cardiovascular:  Negative for palpitations and leg swelling.   Gastrointestinal:  Negative for abdominal pain, diarrhea, nausea and vomiting.   Genitourinary:  Negative for dysuria and frequency.   Musculoskeletal:  Positive for back pain. Negative for neck pain.   Skin:  Negative for rash and wound.   Neurological:  Positive for dizziness.  Negative for syncope.   Psychiatric/Behavioral:  Negative for agitation and confusion.      Objective:     Vital Signs (Most Recent):  Temp: 97.9 °F (36.6 °C) (04/29/24 2312)  Pulse: 74 (04/30/24 0430)  Resp: 19 (04/30/24 0430)  BP: 107/68 (04/30/24 0430)  SpO2: 99 % (04/30/24 0430) Vital Signs (24h Range):  Temp:  [97.9 °F (36.6 °C)] 97.9 °F (36.6 °C)  Pulse:  [74-95] 74  Resp:  [13-20] 19  SpO2:  [95 %-99 %] 99 %  BP: ()/(53-69) 107/68     Weight: 97.5 kg (215 lb)  Body mass index is 29.16 kg/m².     Physical Exam  Vitals and nursing note reviewed.   Constitutional:       General: He is not in acute distress.     Appearance: He is not ill-appearing, toxic-appearing or diaphoretic.   Cardiovascular:      Rate and Rhythm: Normal rate and regular rhythm.      Pulses: Normal pulses.      Heart sounds: No murmur heard.  Pulmonary:      Effort: Pulmonary effort is normal. No respiratory distress.      Breath sounds: Normal breath sounds. No wheezing or rhonchi.   Abdominal:      General: Bowel sounds are normal. There is no distension.      Palpations: Abdomen is soft.      Tenderness: There is no abdominal tenderness. There is no guarding or rebound.   Musculoskeletal:         General: No tenderness.      Right lower leg: No edema.      Left lower leg: No edema.   Skin:     Findings: No erythema or rash.   Neurological:      Mental Status: He is oriented to person, place, and time.      Sensory: Sensory deficit (R hand along lateral aspect of 2nd digit) present.      Motor: No weakness.   Psychiatric:      Comments: Flat affect, cooperative, not anxious                Significant Labs: All pertinent labs within the past 24 hours have been reviewed.  BMP:   Recent Labs   Lab 04/29/24  2349   *      K 4.3      CO2 27   BUN 18   CREATININE 1.8*   CALCIUM 8.8       Significant Imaging: I have reviewed all pertinent imaging results/findings within the past 24 hours.  CXR: I have reviewed all  pertinent results/findings within the past 24 hours and my personal findings are:  possible RUL infiltrate

## 2024-04-30 NOTE — ED PROVIDER NOTES
Encounter Date: 4/29/2024       History     Chief Complaint   Patient presents with    Dizziness     Pt presents to ED c/o dizziness, numbness in fingers/toes. Seen on Fri for same, better with steroids but coming back.     Vomiting    Nausea     48-year-old male presents for evaluation of dizziness, weakness, increased sleep.  Patient has history of sleep apnea, does not use asleep apnea machine at night.  He regularly sleeps 24 hours at a time.  He also has a history of polysubstance abuse including Adderall which he uses to help him stay awake.  Patient reports tonight he awoke 9, felt dizzy, nearly passed out, tried to use sandwich, had no appetite, tried to use the restroom lost control of his bowels before getting to the toilet.  He saw an endocrinologist last week who was concerned for an endocrine tumor, reported he had elevated ACTH over 130.       Review of patient's allergies indicates:  No Known Allergies  Past Medical History:   Diagnosis Date    Degenerated intervertebral disc      Past Surgical History:   Procedure Laterality Date    BACK SURGERY      Laminectomy     Family History   Family history unknown: Yes     Social History     Tobacco Use    Smoking status: Never    Smokeless tobacco: Never   Substance Use Topics    Alcohol use: No    Drug use: No     Review of Systems   Neurological:  Positive for dizziness and weakness.       Physical Exam     Initial Vitals [04/29/24 2312]   BP Pulse Resp Temp SpO2   (!) 86/67 95 16 97.9 °F (36.6 °C) 96 %      MAP       --         Physical Exam    Constitutional: No distress.   HENT:   Head: Normocephalic and atraumatic.   Eyes: Pupils are equal, round, and reactive to light.   Cardiovascular:  Normal rate and regular rhythm.           Pulmonary/Chest: No respiratory distress. He has no wheezes.   Abdominal: Abdomen is soft. He exhibits no distension. There is no abdominal tenderness.     Neurological: He is alert. He has normal strength. He displays normal  "reflexes. No cranial nerve deficit.         ED Course   Procedures  Labs Reviewed   COMPREHENSIVE METABOLIC PANEL - Abnormal; Notable for the following components:       Result Value    Glucose 117 (*)     Creatinine 1.8 (*)     Alkaline Phosphatase 47 (*)     eGFR 45.6 (*)     Anion Gap 7 (*)     All other components within normal limits   CBC W/ AUTO DIFFERENTIAL - Abnormal; Notable for the following components:    WBC 13.63 (*)     RBC 4.49 (*)     Hematocrit 39.6 (*)     MCH 31.2 (*)     Gran # (ANC) 11.7 (*)     Immature Grans (Abs) 0.06 (*)     Gran % 85.7 (*)     Lymph % 8.2 (*)     All other components within normal limits   CULTURE, BLOOD   B-TYPE NATRIURETIC PEPTIDE   LACTIC ACID, PLASMA   TROPONIN I HIGH SENSITIVITY   TSH   CORTISOL, RANDOM   URINALYSIS, REFLEX TO URINE CULTURE   ACTH   DRUG SCREEN PANEL, URINE EMERGENCY   PROCALCITONIN   CBC W/ AUTO DIFFERENTIAL   POCT GLUCOSE MONITORING CONTINUOUS          Imaging Results              X-Ray Chest AP Portable (Final result)  Result time 04/30/24 02:09:27      Final result by Agustín Flores MD (04/30/24 02:09:27)                   Impression:      Coarse interstitial lung markings and questionable early airspace disease over the right upper lobe.  Suggest correlation for any clinical signs of pneumonia or aspiration.  Please note that this finding could be exaggerated by soft tissue attenuation of the x-ray beam and portable technique.  Follow-up PA and lateral views may provide improved sensitivity if there is clinical uncertainty.      Electronically signed by: Agustín Flores MD  Date:    04/30/2024  Time:    02:09               Narrative:    EXAMINATION:  XR CHEST AP PORTABLE    CLINICAL HISTORY:  Provided history is "  Hypotension, unspecified".  Additional history is leukocytosis.  No physician note available in the chart for additional history at the time of dictation.    TECHNIQUE:  One view of the " chest.    COMPARISON:  None.    FINDINGS:  Cardiac wires overlie the chest.  Cardiomediastinal silhouette is not enlarged.  Coarse perihilar interstitial lung markings and patchy increased ground-glass/airspace attenuation in the right mid/upper lung.  Unclear if this finding is exaggerated by portable technique and soft tissue attenuation of the x-ray beam.  No other confluent area of consolidation.  No sizable pleural effusion.  No pneumothorax.                                       CT Head Without Contrast (Final result)  Result time 04/30/24 00:33:24      Final result by Reinaldo Calderon MD (04/30/24 00:33:24)                   Impression:      No acute abnormality.      Electronically signed by: Reinaldo Calderon  Date:    04/30/2024  Time:    00:33               Narrative:    EXAMINATION:  CT HEAD WITHOUT CONTRAST    CLINICAL HISTORY:  Syncope, recurrent;    TECHNIQUE:  Low dose axial CT images obtained throughout the head without intravenous contrast. Sagittal and coronal reconstructions were performed.    COMPARISON:  MRI of the brain, 05/09/2022    FINDINGS:  Intracranial compartment:    Ventricles and sulci are stable in size for age without evidence of hydrocephalus. No extra-axial blood or fluid collections.  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    The brain parenchyma appears stable.  No parenchymal mass, hemorrhage, edema or major vascular distribution infarct.    Skull/extracranial contents (limited evaluation): No fracture. Mastoid air cells and paranasal sinuses are essentially clear.                                       Medications   sodium chloride 0.9% flush 10 mL (has no administration in time range)   melatonin tablet 3 mg (has no administration in time range)   ondansetron injection 4 mg (has no administration in time range)   senna-docusate 8.6-50 mg per tablet 1 tablet (has no administration  in time range)   aluminum-magnesium hydroxide-simethicone 200-200-20 mg/5 mL suspension 30 mL (has no administration in time range)   acetaminophen tablet 650 mg (has no administration in time range)   HYDROcodone-acetaminophen 5-325 mg per tablet 1 tablet (has no administration in time range)   naloxone 0.4 mg/mL injection 0.02 mg (has no administration in time range)   potassium bicarbonate disintegrating tablet 50 mEq (has no administration in time range)   potassium bicarbonate disintegrating tablet 35 mEq (has no administration in time range)   potassium bicarbonate disintegrating tablet 60 mEq (has no administration in time range)   magnesium oxide tablet 800 mg (has no administration in time range)   magnesium oxide tablet 800 mg (has no administration in time range)   potassium, sodium phosphates 280-160-250 mg packet 2 packet (has no administration in time range)   potassium, sodium phosphates 280-160-250 mg packet 2 packet (has no administration in time range)   potassium, sodium phosphates 280-160-250 mg packet 2 packet (has no administration in time range)   glucose chewable tablet 16 g (has no administration in time range)   glucose chewable tablet 24 g (has no administration in time range)   dextrose 50% injection 12.5 g (has no administration in time range)   dextrose 50% injection 25 g (has no administration in time range)   glucagon (human recombinant) injection 1 mg (has no administration in time range)   prochlorperazine injection Soln 5 mg (has no administration in time range)   sodium chloride 0.9% bolus 1,000 mL 1,000 mL (0 mLs Intravenous Stopped 4/30/24 0220)     Medical Decision Making  48-year-old male presents for dizziness, weakness, increased sleep over past few days.  Initial blood pressure notable for hypotension, otherwise normal.  Patient does have recent endocrinologist visit with elevated ACTH.  Differential diagnosis includes dehydration, polysubstance abuse, stroke, pituitary  tumor.  Patient given fluid bolus, broad workup initiated without clear etiology.  CT head without acute intracranial abnormality.  Patient remained hypotensive after fluid bolus, will admit to hospital medicine for further workup of elevated ACTH, MRI to rule out pituitary tumor.    Amount and/or Complexity of Data Reviewed  Labs: ordered.  Radiology: ordered.                                      Clinical Impression:  Final diagnoses:  [R51.9] Nonintractable headache, unspecified chronicity pattern, unspecified headache type (Primary)  [R42] Dizziness  [R79.89] ACTH elevation  [I95.9] Hypotension          ED Disposition Condition    Observation                 Hector Ballard MD  04/30/24 2021

## 2024-04-30 NOTE — SUBJECTIVE & OBJECTIVE
Interval History:   48-year-old  male with history of ?  Elevated ACTH, MARIA ELENA noncompliant with CPAP, hypertension presenting here for generalized weakness, severe fatigue, 4 extremities paresthesia.     Seen In the multidisciplinary rounds, patient denies any fever or chills, patient has lost 15 lb in past few weeks unintentionally, patient denies nausea vomiting diarrhea, no chest pain or SOB, complaining of cough intermittently.  No sick contact.  Patient recently had ER visit for neck pain, had MRI of C-spine diagnosed with cervical spondylopathy was given p.o. prednisone.  BP is running low.         Review of Systems   Constitutional:  Positive for fatigue and unexpected weight change. Negative for chills and fever.   Respiratory:  Negative for cough and shortness of breath.    Cardiovascular:  Negative for chest pain and leg swelling.   Gastrointestinal:  Negative for abdominal pain, nausea and vomiting.   Musculoskeletal:  Negative for back pain.   Neurological:  Positive for weakness.   Psychiatric/Behavioral:  Negative for confusion. The patient is not nervous/anxious.    All other systems reviewed and are negative.    Objective:     Vital Signs (Most Recent):  Temp: 98.5 °F (36.9 °C) (04/30/24 0737)  Pulse: 92 (04/30/24 0737)  Resp: 18 (04/30/24 0737)  BP: (!) 83/53 (nurse alerted) (04/30/24 0737)  SpO2: 99 % (04/30/24 0737) Vital Signs (24h Range):  Temp:  [97.9 °F (36.6 °C)-98.5 °F (36.9 °C)] 98.5 °F (36.9 °C)  Pulse:  [74-95] 92  Resp:  [13-20] 18  SpO2:  [95 %-99 %] 99 %  BP: ()/(53-75) 83/53     Weight: 95.8 kg (211 lb 3.2 oz)  Body mass index is 28.64 kg/m².    Intake/Output Summary (Last 24 hours) at 4/30/2024 1119  Last data filed at 4/30/2024 0220  Gross per 24 hour   Intake 1000 ml   Output --   Net 1000 ml         Physical Exam  Vitals and nursing note reviewed.   Constitutional:       General: He is not in acute distress.     Appearance: He is not diaphoretic.   HENT:      Head:  Normocephalic.   Eyes:      General: No scleral icterus.        Right eye: No discharge.         Left eye: No discharge.      Conjunctiva/sclera: Conjunctivae normal.   Neck:      Vascular: No JVD.   Cardiovascular:      Rate and Rhythm: Normal rate and regular rhythm.      Heart sounds: Normal heart sounds. No murmur heard.     No friction rub.   Pulmonary:      Effort: Pulmonary effort is normal. No respiratory distress.      Breath sounds: Normal breath sounds. No wheezing.   Abdominal:      General: Bowel sounds are normal. There is no distension.      Palpations: Abdomen is soft.      Tenderness: There is no abdominal tenderness.   Musculoskeletal:         General: Normal range of motion.   Lymphadenopathy:      Cervical: No cervical adenopathy.   Skin:     Findings: No erythema or rash.   Neurological:      Mental Status: He is alert and oriented to person, place, and time.      Deep Tendon Reflexes: Reflexes are normal and symmetric.   Psychiatric:         Behavior: Behavior normal.             Significant Labs: All pertinent labs within the past 24 hours have been reviewed.  BMP:   Recent Labs   Lab 04/30/24  1002         K 3.9      CO2 28   BUN 15   CREATININE 1.3   CALCIUM 8.3*     CBC:   Recent Labs   Lab 04/29/24  2349 04/30/24  0911   WBC 13.63* 7.97   HGB 14.0 12.4*   HCT 39.6* 35.3*    207       Significant Imaging: I have reviewed all pertinent imaging results/findings within the past 24 hours.  I have reviewed and interpreted all pertinent imaging results/findings within the past 24 hours.    MRI Brain Pituitary Without Contrast    Result Date: 4/30/2024  EXAMINATION: MRI BRAIN PITUITARY WITHOUT CONTRAST CLINICAL HISTORY: Pituitary adenoma suspected;. TECHNIQUE: Multiplanar multisequence MR imaging of the brain was performed before and after the administration of 5 mL Gadavist  intravenous contrast using the pituitary protocol. This included acquisitions showing dynamic  "contrast enhancement of the sella turcica in the coronal plane and a post-contrast T1-weighted sequence. COMPARISON: CT brain dated 04/30/2024 and MRI brain dated 05/09/2022 FINDINGS: Intracranial compartment: Pituitary gland is normal in height, signal, and contour. The pituitary infundibulum is midline without abnormal thickening.  The optic chiasm and orbits are normal.  No discrete sellar or suprasellar lesion identified. Ventricles and sulci are normal in size for age without evidence of hydrocephalus. No extra-axial blood or fluid collections. No abnormal intracranial enhancement. Normal vascular flow voids are preserved. Bone marrow signal intensity is normal. Paranasal sinuses and mastoid air cells are essentially clear.     Normal MRI of the pituitary gland Electronically signed by: Donita Urbina Date:    04/30/2024 Time:    07:23    X-Ray Chest AP Portable    Result Date: 4/30/2024  EXAMINATION: XR CHEST AP PORTABLE CLINICAL HISTORY: Provided history is "  Hypotension, unspecified".  Additional history is leukocytosis.  No physician note available in the chart for additional history at the time of dictation. TECHNIQUE: One view of the chest. COMPARISON: None. FINDINGS: Cardiac wires overlie the chest.  Cardiomediastinal silhouette is not enlarged.  Coarse perihilar interstitial lung markings and patchy increased ground-glass/airspace attenuation in the right mid/upper lung.  Unclear if this finding is exaggerated by portable technique and soft tissue attenuation of the x-ray beam.  No other confluent area of consolidation.  No sizable pleural effusion.  No pneumothorax.     Coarse interstitial lung markings and questionable early airspace disease over the right upper lobe.  Suggest correlation for any clinical signs of pneumonia or aspiration.  Please note that this finding could be exaggerated by soft tissue attenuation of the x-ray beam and portable technique.  Follow-up PA and lateral views may " provide improved sensitivity if there is clinical uncertainty. Electronically signed by: Agustín Flores MD Date:    04/30/2024 Time:    02:09    CT Head Without Contrast    Result Date: 4/30/2024  EXAMINATION: CT HEAD WITHOUT CONTRAST CLINICAL HISTORY: Syncope, recurrent; TECHNIQUE: Low dose axial CT images obtained throughout the head without intravenous contrast. Sagittal and coronal reconstructions were performed. COMPARISON: MRI of the brain, 05/09/2022 FINDINGS: Intracranial compartment: Ventricles and sulci are stable in size for age without evidence of hydrocephalus. No extra-axial blood or fluid collections.  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 The brain parenchyma appears stable.  No parenchymal mass, hemorrhage, edema or major vascular distribution infarct. Skull/extracranial contents (limited evaluation): No fracture. Mastoid air cells and paranasal sinuses are essentially clear.     No acute abnormality. Electronically signed by: Reinaldo Calderon Date:    04/30/2024 Time:    00:33    MRI Cervical Spine Without Contrast    Result Date: 4/22/2024  MRI cervical spine without contrast HISTORY: Numbness in index fingers running up arm TECHNIQUE: Sagittal T1, T2, and inversion recovery images of the cervical spine were performed as well as axial T2-weighted images. FINDINGS: Sagittal images demonstrate straightening of the overall cervical lordosis. Alignment, vertebral body height, and marrow signal are maintained. There is no evidence of compression fracture or subluxation. There are anterior and posterior osteophytes of C4, C5, and C6. There is disc space narrowing of C3-4 and C6-7. The cervical spinal cord is normal in shape and signal. Soft tissues are unremarkable. Axial images demonstrate left uncinate process hypertrophy of C2-3 to reduce and mild left neural foraminal narrowing. Posterior osteophyte and  left posterior lateral disc bulge of C3-4 produces left anterior thecal sac deformity with severe left neural foraminal narrowing. A broad-based posterior disc bulge of C4-5 produces anterior thecal sac deformity with mild bilateral neural foraminal narrowing. A broad-based posterior disc bulge of C5-6 produces anterior thecal sac deformity with moderate left neural foraminal narrowing. Broad-based disc bulge of C6-7 produces moderate bilateral neural foraminal narrowing anterior thecal sac deformity. C7-T1 is unremarkable. The cervical spinal cord is normal in signal. Soft tissues are unremarkable.    1. There are anterior and posterior osteophytes of C4, C5, and C6. There is disc space narrowing of C3-4 and C6-7. 2. There is left uncinate process hypertrophy of C2-3 to reduce and mild left neural foraminal narrowing. 3. Posterior osteophyte and left posterior lateral disc bulge of C3-4 produces left anterior thecal sac deformity with severe left neural foraminal narrowing. 4. A broad-based posterior disc bulge of C4-5 produces anterior thecal sac deformity with mild bilateral neural foraminal narrowing. 5. A broad-based posterior disc bulge of C5-6 produces anterior thecal sac deformity with moderate left neural foraminal narrowing. 6. Broad-based disc bulge of C6-7 produces moderate bilateral neural foraminal narrowing anterior thecal sac deformity. Electronically Signed By: Ronald Fox MD 4/22/2024 12:07 PM CDT  - pulls last radiology orders

## 2024-04-30 NOTE — RESPIRATORY THERAPY
04/30/24 0710   Patient Assessment/Suction   Level of Consciousness (AVPU) alert   Respiratory Effort Normal;Unlabored   Expansion/Accessory Muscles/Retractions no retractions;no use of accessory muscles;expansion symmetric   PRE-TX-O2   Device (Oxygen Therapy) room air   SpO2 95 %   Pulse Oximetry Type Intermittent   $ Pulse Oximetry - Multiple Charge Pulse Oximetry - Multiple   Pulse 75   Resp 16   Preset CPAP/BiPAP Settings   Mode Of Delivery CPAP  (Pt will have someone bring his CPAP machine in for tonight.)

## 2024-04-30 NOTE — HPI
48M p/w hypersomnolence. He reports that he had an uneventful weekend and went to bed at 8p on 4/28. He slept until around 8p 4/29. When he awoke, he reports that he had RUE paresthesias, dizziness, and anorexia. He went to use the restroom and felt unsteady, defecating on the floor by accident. This is unusual for the patient. However, hypersomnolence is not new for the patient; he reports years of experiencing inexplicable fatigue. He has had 2 sleep studies, and has been told he has MARIA ELENA and narcolepsy. He has been prescribed CPAP, but only occasionally uses it (and he did not during this preceding weekend). He uses AM adderall to help wake up. He has chronic pain and his pain provider encourages use of medical THC, and he has PRN back-up opiates. The patient denies over-use of opiates or THC during this preceding weekend. The patient did recently have labs performed by this endocrinologist, and he has an elevated ACTH (130, but has previously been 60-70 over past 7 years). Consequently, she was going to get repeat pituitary MRI (last one 2 years ago), but the patient missed his 4/29 appt w/ her.

## 2024-04-30 NOTE — ASSESSMENT & PLAN NOTE
2/2 hypovolemia and hypotension, reviewed outside records and prior Cr 0.7 on 9/1/21. Avoid NSAIDs, given 1L NS by Dr. Young, and repeat BMP this AM.

## 2024-04-30 NOTE — PHARMACY MED REC
"Admission Medication History     The home medication history was taken by Christian Lawson.    You may go to "Admission" then "Reconcile Home Medications" tabs to review and/or act upon these items.     The home medication list has been updated by the Pharmacy department.   Please read ALL comments highlighted in yellow.   Please address this information as you see fit.    Feel free to contact us if you have any questions or require assistance.      The medications listed below were removed from the home medication list. Please reorder if appropriate:  Patient reports no longer taking the following medication(s):  Valtrex 1000 mg  Vraylar 3 mg  Sildenafil 20 mg    Medications listed below were obtained from: Patient/family and [employer]- [employer]  No current facility-administered medications on file prior to encounter.     Current Outpatient Medications on File Prior to Encounter   Medication Sig Dispense Refill    amLODIPine (NORVASC) 10 MG tablet Take 1 tablet (10 mg total) by mouth every evening. 30 tablet 11    AUVELITY  mg TbIE Take 1 tablet by mouth 2 (two) times daily.      BELBUCA 600 mcg Film Take 1 strip by mouth 2 (two) times daily.      dextroamphetamine-amphetamine 30 mg Tab Take 1 tablet by mouth 2 (two) times daily.      EScitalopram oxalate (LEXAPRO) 20 MG tablet Take 20 mg by mouth once daily.      oxyCODONE (ROXICODONE) 10 mg Tab immediate release tablet Take 10 mg by mouth every 12 (twelve) hours.      propranoloL (INDERAL) 20 MG tablet Take 20 mg by mouth Daily.      tadalafiL (CIALIS) 20 MG Tab Take 20 mg by mouth daily as needed (ED).      [DISCONTINUED] ADDERALL XR 20 mg 24 hr capsule Take 20 mg by mouth as needed (Promote wakefulness).      [DISCONTINUED] ARIPiprazole (ABILIFY) 2 MG Tab Take 1 tablet by mouth once daily.      [DISCONTINUED] HYDROcodone-acetaminophen (NORCO)  mg per tablet Take 1 tablet by mouth every 12 (twelve) hours as needed for Pain.      [DISCONTINUED] " oxyCODONE (OXY-IR) 5 mg Cap Take 5 mg by mouth 2 (two) times daily as needed for Pain (back pain). (Patient not taking: Reported on 4/30/2024)      [DISCONTINUED] oxyCODONE (OXY-IR) 5 mg Cap Take 5 mg by mouth 2 (two) times daily as needed for Pain.      [DISCONTINUED] sildenafil (REVATIO) 20 mg Tab TAKE 1 5 TABLETS 1 HOUR PRIOR TO INTERCOURSE.      [DISCONTINUED] valACYclovir (VALTREX) 1000 MG tablet Take 1,000 mg by mouth daily as needed (breakouts).      [DISCONTINUED] VRAYLAR 3 mg Cap Take 3 mg by mouth Daily. (Patient not taking: Reported on 4/30/2024)             Christian Lawson  EXT 1913                .

## 2024-04-30 NOTE — PLAN OF CARE
Critical access hospital  Initial Discharge Assessment       Primary Care Provider: No primary care provider on file.    Admission Diagnosis: Nonintractable headache, unspecified chronicity pattern, unspecified headache type [R51.9]    Admission Date: 4/29/2024  Expected Discharge Date: 5/2/2024    Met with pt at bedside to complete discharge assessment, verified PCP, pharmacy and information on facesheet.  Pt stated Dr. Vines in [place], but doesn't know first name. No HH, dialysis and coumadin.  Pt has CPAP.  Pt will drive self home.    Transition of Care Barriers: None    Payor: AETNA / Plan: [employer] CHOICE POS / Product Type: Commercial /     Extended Emergency Contact Information  Primary Emergency Contact: Víctor Hastings  Mobile Phone: [phone]  Relation: Brother  Preferred language: English    Discharge Plan A: Home  Discharge Plan B: Home      [id number] - [place] - [address]  [address] [place] [address]  Phone: [phone] Fax: [phone]      Initial Assessment (most recent)       Adult Discharge Assessment - 04/30/24 1354          Discharge Assessment    Assessment Type Discharge Planning Assessment     Confirmed/corrected address, phone number and insurance Yes     Confirmed Demographics Correct on Facesheet     Source of Information patient     Communicated SEEMA with patient/caregiver No     People in Home sibling(s)     Do you expect to return to your current living situation? Yes     Prior to hospitilization cognitive status: Alert/Oriented     Current cognitive status: Alert/Oriented     Walking or Climbing Stairs Difficulty no     Dressing/Bathing Difficulty no     Home Accessibility wheelchair accessible     Home Layout Able to live on 1st floor     Equipment Currently Used at Home CPAP     Readmission within 30 days? No     Patient currently being followed by outpatient case management? No     Do you currently have service(s) that help you manage your care at  home? No     Do you take prescription medications? Yes     Do you have prescription coverage? Yes     Coverage [employer]     Do you have any problems affording any of your prescribed medications? No     Is the patient taking medications as prescribed? yes     Who is going to help you get home at discharge? self     How do you get to doctors appointments? car, drives self     Are you on dialysis? No     Do you take coumadin? No     Discharge Plan A Home     Discharge Plan B Home     DME Needed Upon Discharge  none     Discharge Plan discussed with: Patient     Transition of Care Barriers None

## 2024-04-30 NOTE — NURSING
Nurses Note -- 4 Eyes      4/30/2024   7:31 AM      Skin assessed during: Admit      [] No Altered Skin Integrity Present    []Prevention Measures Documented      [x] Yes- Altered Skin Integrity Present or Discovered   [] LDA Added if Not in Epic (Describe Wound)   [] New Altered Skin Integrity was Present on Admit and Documented in LDA   [x] Wound Image Taken    Wound Care Consulted? No    Attending Nurse:criss Miranda RN/Staff Member:  Cheryl

## 2024-04-30 NOTE — ASSESSMENT & PLAN NOTE
Unclear etiology, may relate to MARIA ELENA noncompliant with CPAP, possible narcolepsy?  Patient also has hypotension, possible?  Adrenal insufficiency  Cortisol level within normal last night, however patient was recently taken p.o. prednisone.   Repeat cortisone level, if high suspicion, may need ACTH stimulation test.   Patient has abnormal chest x-ray, we will get CT chest with IV contrast to rule out malignancy.  Check HIV, hepatitis-C, hepatitis-B, free T4, check testosterone level.   Continue IV hydration  Patient also has RICCARDO

## 2024-04-30 NOTE — RESPIRATORY THERAPY
04/30/24 1634   Patient Assessment/Suction   Level of Consciousness (AVPU) alert   Respiratory Effort Normal;Unlabored   Expansion/Accessory Muscles/Retractions no use of accessory muscles;no retractions;expansion symmetric   All Lung Fields Breath Sounds Anterior:;Posterior:;clear   Cough Frequency no cough   PRE-TX-O2   Device (Oxygen Therapy) room air   SpO2 96 %   Pulse 84   Resp 18   Preset CPAP/BiPAP Settings   Mode Of Delivery CPAP   Tobacco Cessation Intervention   Do you use any type of tobacco product? No   Respiratory Evaluation   $ Care Plan Tech Time 15 min   Evaluation For New Orders   Admitting Diagnosis fatigue   Home Oxygen   Has Home Oxygen? No   Home Aerosol, MDI, DPI, and Other Treatments/Therapies   Home Respiratory Therapy Per Patient/Review of Chart Yes   Other Home Respiratory Therapies   (CPAP)   Oxygen Care Plan   Rationale No rational found   Bronchodilator Care Plan   Rationale No Rationale found   Atelectasis Care Plan   Rationale No Rational Found   Airway Clearance Care Plan   Rationale No rationale found

## 2024-04-30 NOTE — ASSESSMENT & PLAN NOTE
Most likely 2/2 poor PO intake, however, with leukocytosis and possible RUL air space disease on CXR (and aspiration risk with hypersomnolence), it is prudent to r/o infxn w/ procal, repeat WBC this AM, and consider Abx. Also get orthostatic VS to ensure he is not still hypovolemic. His initial hypotension was also likely exacerbated by him taking his home propranolol and amlodipine.

## 2024-04-30 NOTE — ASSESSMENT & PLAN NOTE
Unclear etiology, although it appears acute on chronic w/ recent sleep extending reportedly 24hrs. Patient denies any preceding activities w/ drugs, alcohol, or gabby which would provoke a sleep debt. Instead, it seems more likely that he has untreated MARIA ELENA and chronically has poor sleep, and this stretch may be an acute manifestation of that. It may have led to his low PO intake and RICCARDO/hypotension from hypovolemia. Reviewed outside records and I am not convinced that he has an adrenopituitary axis issue - his cortisol level is essentially wnl tonight, and his prior cosyn stim test was also fairly unremarkable. What is unusual is his ACTH level of 130, and a pituitary MRI is warranted.

## 2024-04-30 NOTE — H&P
[facility] - Emergency Dept  Hospital Medicine  History & Physical    Patient Name: Raúl Hastings  MRN: [id number]  Patient Class: OP- Observation  Admission Date: 4/29/2024  Attending Physician: Humberto Benson MD   Primary Care Provider: Alejandrina Alicea MD         Patient information was obtained from patient and ER records.     Subjective:     Principal Problem:Hypersomnolence    Chief Complaint:   Chief Complaint   Patient presents with    Dizziness     Pt presents to ED c/o dizziness, numbness in fingers/toes. Seen on Fri for same, better with steroids but coming back.     Vomiting    Nausea        HPI: 48M p/w hypersomnolence. He reports that he had an uneventful weekend and went to bed at 8p on 4/28. He slept until around 8p 4/29. When he awoke, he reports that he had RUE paresthesias, dizziness, and anorexia. He went to use the restroom and felt unsteady, defecating on the floor by accident. This is unusual for the patient. However, hypersomnolence is not new for the patient; he reports years of experiencing inexplicable fatigue. He has had 2 sleep studies, and has been told he has MARIA ELENA and narcolepsy. He has been prescribed CPAP, but only occasionally uses it (and he did not during this preceding weekend). He uses AM adderall to help wake up. He has chronic pain and his pain provider encourages use of medical THC, and he has PRN back-up opiates. The patient denies over-use of opiates or THC during this preceding weekend. The patient did recently have labs performed by this endocrinologist, and he has an elevated ACTH (130, but has previously been 60-70 over past 7 years). Consequently, she was going to get repeat pituitary MRI (last one 2 years ago), but the patient missed his 4/29 appt w/ her.    Past Medical History:   Diagnosis Date    Degenerated intervertebral disc        Past Surgical History:   Procedure Laterality Date    BACK SURGERY      Laminectomy       Review of  patient's allergies indicates:  No Known Allergies    Current Facility-Administered Medications   Medication Dose Route Frequency Provider Last Rate Last Admin    acetaminophen tablet 650 mg  650 mg Oral Q4H PRN Humberto Benson MD        aluminum-magnesium hydroxide-simethicone 200-200-20 mg/5 mL suspension 30 mL  30 mL Oral QID Humberto Lao MD        dextrose 50% injection 12.5 g  12.5 g Intravenous PRN Humberto Benson MD        dextrose 50% injection 25 g  25 g Intravenous PRN Humberto Benson MD        glucagon (human recombinant) injection 1 mg  1 mg Intramuscular PRN Humberto Benson MD        glucose chewable tablet 16 g  16 g Oral PRN Humberto Benson MD        glucose chewable tablet 24 g  24 g Oral PRN Humberto Benson MD        HYDROcodone-acetaminophen 5-325 mg per tablet 1 tablet  1 tablet Oral Q6H PRHumberto Minaya MD        magnesium oxide tablet 800 mg  800 mg Oral PRHumberto Minaya MD        magnesium oxide tablet 800 mg  800 mg Oral PRN Humberto Benson MD        melatonin tablet 3 mg  3 mg Oral Nightly PRHumberto Minaya MD        naloxone 0.4 mg/mL injection 0.02 mg  0.02 mg Intravenous PRHumberto Minaya MD        ondansetron injection 4 mg  4 mg Intravenous Q6H PRHumberto Minaya MD        potassium bicarbonate disintegrating tablet 35 mEq  35 mEq Oral Humberto Lao MD        potassium bicarbonate disintegrating tablet 50 mEq  50 mEq Oral Humberto Lao MD        potassium bicarbonate disintegrating tablet 60 mEq  60 mEq Oral Humberto Lao MD        potassium, sodium phosphates 280-160-250 mg packet 2 packet  2 packet Oral PRN Humberto Benson MD        potassium, sodium phosphates 280-160-250 mg packet 2 packet  2 packet Oral PRN Humberto Benson MD        potassium, sodium phosphates 280-160-250 mg packet 2 packet  2 packet Oral PRHumberto Minaya MD        prochlorperazine injection Soln 5 mg  5 mg Intravenous Q6H PRHumberto Minaya MD        senna-docusate 8.6-50 mg  per tablet 1 tablet  1 tablet Oral BID Humberto Benson MD        sodium chloride 0.9% flush 10 mL  10 mL Intravenous Q12H PRN Humberto Benson MD         Current Outpatient Medications   Medication Sig Dispense Refill    ADDERALL XR 20 mg 24 hr capsule Take by mouth 2 (two) times daily.      amLODIPine (NORVASC) 10 MG tablet Take 1 tablet (10 mg total) by mouth every evening. 30 tablet 11    ARIPiprazole (ABILIFY) 2 MG Tab Take 1 tablet by mouth once daily.      EScitalopram oxalate (LEXAPRO) 20 MG tablet Take 20 mg by mouth once daily.      HYDROcodone-acetaminophen (NORCO)  mg per tablet Take 1 tablet by mouth every 12 (twelve) hours as needed for Pain.      propranoloL (INDERAL) 20 MG tablet Take 20 mg by mouth.      sildenafil (REVATIO) 20 mg Tab TAKE 1 5 TABLETS 1 HOUR PRIOR TO INTERCOURSE.      valACYclovir (VALTREX) 1000 MG tablet Valtrex 1 gram tablet   Take 1 tablet 4 times a day by oral route.       Family History    Family history is unknown by patient.       Tobacco Use    Smoking status: Never    Smokeless tobacco: Never   Substance and Sexual Activity    Alcohol use: No    Drug use: No    Sexual activity: Yes     Partners: Male, Female     Review of Systems   Constitutional:  Positive for fatigue. Negative for chills and fever.   Eyes:  Negative for photophobia and visual disturbance.   Respiratory:  Negative for cough, chest tightness, shortness of breath and wheezing.    Cardiovascular:  Negative for palpitations and leg swelling.   Gastrointestinal:  Negative for abdominal pain, diarrhea, nausea and vomiting.   Genitourinary:  Negative for dysuria and frequency.   Musculoskeletal:  Positive for back pain. Negative for neck pain.   Skin:  Negative for rash and wound.   Neurological:  Positive for dizziness. Negative for syncope.   Psychiatric/Behavioral:  Negative for agitation and confusion.      Objective:     Vital Signs (Most Recent):  Temp: 97.9 °F (36.6 °C) (04/29/24 2312)  Pulse: 74  (04/30/24 0430)  Resp: 19 (04/30/24 0430)  BP: 107/68 (04/30/24 0430)  SpO2: 99 % (04/30/24 0430) Vital Signs (24h Range):  Temp:  [97.9 °F (36.6 °C)] 97.9 °F (36.6 °C)  Pulse:  [74-95] 74  Resp:  [13-20] 19  SpO2:  [95 %-99 %] 99 %  BP: ()/(53-69) 107/68     Weight: 97.5 kg (215 lb)  Body mass index is 29.16 kg/m².     Physical Exam  Vitals and nursing note reviewed.   Constitutional:       General: He is not in acute distress.     Appearance: He is not ill-appearing, toxic-appearing or diaphoretic.   Cardiovascular:      Rate and Rhythm: Normal rate and regular rhythm.      Pulses: Normal pulses.      Heart sounds: No murmur heard.  Pulmonary:      Effort: Pulmonary effort is normal. No respiratory distress.      Breath sounds: Normal breath sounds. No wheezing or rhonchi.   Abdominal:      General: Bowel sounds are normal. There is no distension.      Palpations: Abdomen is soft.      Tenderness: There is no abdominal tenderness. There is no guarding or rebound.   Musculoskeletal:         General: No tenderness.      Right lower leg: No edema.      Left lower leg: No edema.   Skin:     Findings: No erythema or rash.   Neurological:      Mental Status: He is oriented to person, place, and time.      Sensory: Sensory deficit (R hand along lateral aspect of 2nd digit) present.      Motor: No weakness.   Psychiatric:      Comments: Flat affect, cooperative, not anxious                Significant Labs: All pertinent labs within the past 24 hours have been reviewed.  BMP:   Recent Labs   Lab 04/29/24  2349   *      K 4.3      CO2 27   BUN 18   CREATININE 1.8*   CALCIUM 8.8       Significant Imaging: I have reviewed all pertinent imaging results/findings within the past 24 hours.  CXR: I have reviewed all pertinent results/findings within the past 24 hours and my personal findings are:  possible RUL infiltrate  Assessment/Plan:     48M presenting after sleeping approx 24h for unclear reasons,  although he does have a hx of hypersomnolence. On this presentation, he also has hypotension and RICCARDO.    * Hypersomnolence  Unclear etiology, although it appears acute on chronic w/ recent sleep extending reportedly 24hrs. Patient denies any preceding activities w/ drugs, alcohol, or gabby which would provoke a sleep debt. Instead, it seems more likely that he has untreated MARIA ELENA and chronically has poor sleep, and this stretch may be an acute manifestation of that. It may have led to his low PO intake and RICCARDO/hypotension from hypovolemia. Reviewed outside records and I am not convinced that he has an adrenopituitary axis issue - his cortisol level is essentially wnl tonight, and his prior cosyn stim test was also fairly unremarkable. What is unusual is his ACTH level of 130, and a pituitary MRI is warranted.      Hypotension due to hypovolemia  Most likely 2/2 poor PO intake, however, with leukocytosis and possible RUL air space disease on CXR (and aspiration risk with hypersomnolence), it is prudent to r/o infxn w/ procal, repeat WBC this AM, and consider Abx. Also get orthostatic VS to ensure he is not still hypovolemic. His initial hypotension was also likely exacerbated by him taking his home propranolol and amlodipine.      RICCARDO (acute kidney injury)  2/2 hypovolemia and hypotension, reviewed outside records and prior Cr 0.7 on 9/1/21. Avoid NSAIDs, given 1L NS by Dr. Young, and repeat BMP this AM.    MARIA ELENA (obstructive sleep apnea)  Encourage CPAP use and better sleep hygiene. Turn off the TV at night when trying to sleep.        VTE Risk Mitigation (From admission, onward)           Ordered     IP VTE LOW RISK PATIENT  Once         04/30/24 0429     Place sequential compression device  Until discontinued         04/30/24 0429                       On 04/30/2024, patient should be placed in hospital observation services under my care.             VINH ROBLES MD  Department of Hospital Medicine  [facility]  [facility] - Emergency Dept

## 2024-04-30 NOTE — PROGRESS NOTES
[facility]  Progress Note    Patient Name: Raúl Hastings  MRN: [id number]  Patient Class: OP- Observation   Admission Date: 4/29/2024  Length of Stay: 0 days  Attending Physician: Suzanne Baker MD  Primary Care Provider: Alejandrina Alicea MD        Subjective:     Principal Problem:Fatigue        HPI:  48M p/w hypersomnolence. He reports that he had an uneventful weekend and went to bed at 8p on 4/28. He slept until around 8p 4/29. When he awoke, he reports that he had RUE paresthesias, dizziness, and anorexia. He went to use the restroom and felt unsteady, defecating on the floor by accident. This is unusual for the patient. However, hypersomnolence is not new for the patient; he reports years of experiencing inexplicable fatigue. He has had 2 sleep studies, and has been told he has MARIA ELENA and narcolepsy. He has been prescribed CPAP, but only occasionally uses it (and he did not during this preceding weekend). He uses AM adderall to help wake up. He has chronic pain and his pain provider encourages use of medical THC, and he has PRN back-up opiates. The patient denies over-use of opiates or THC during this preceding weekend. The patient did recently have labs performed by this endocrinologist, and he has an elevated ACTH (130, but has previously been 60-70 over past 7 years). Consequently, she was going to get repeat pituitary MRI (last one 2 years ago), but the patient missed his 4/29 appt w/ her.    Overview/Hospital Course:  No notes on file    Interval History:   48-year-old  male with history of ?  Elevated ACTH, MARIA ELENA noncompliant with CPAP, hypertension presenting here for generalized weakness, severe fatigue, 4 extremities paresthesia.     Seen In the multidisciplinary rounds, patient denies any fever or chills, patient has lost 15 lb in past few weeks unintentionally, patient denies nausea vomiting diarrhea, no chest pain or SOB, complaining of cough  intermittently.  No sick contact.  Patient recently had ER visit for neck pain, had MRI of C-spine diagnosed with cervical spondylopathy was given p.o. prednisone.  BP is running low.         Review of Systems   Constitutional:  Positive for fatigue and unexpected weight change. Negative for chills and fever.   Respiratory:  Negative for cough and shortness of breath.    Cardiovascular:  Negative for chest pain and leg swelling.   Gastrointestinal:  Negative for abdominal pain, nausea and vomiting.   Musculoskeletal:  Negative for back pain.   Neurological:  Positive for weakness.   Psychiatric/Behavioral:  Negative for confusion. The patient is not nervous/anxious.    All other systems reviewed and are negative.    Objective:     Vital Signs (Most Recent):  Temp: 98.5 °F (36.9 °C) (04/30/24 0737)  Pulse: 92 (04/30/24 0737)  Resp: 18 (04/30/24 0737)  BP: (!) 83/53 (nurse alerted) (04/30/24 0737)  SpO2: 99 % (04/30/24 0737) Vital Signs (24h Range):  Temp:  [97.9 °F (36.6 °C)-98.5 °F (36.9 °C)] 98.5 °F (36.9 °C)  Pulse:  [74-95] 92  Resp:  [13-20] 18  SpO2:  [95 %-99 %] 99 %  BP: ()/(53-75) 83/53     Weight: 95.8 kg (211 lb 3.2 oz)  Body mass index is 28.64 kg/m².    Intake/Output Summary (Last 24 hours) at 4/30/2024 1119  Last data filed at 4/30/2024 0220  Gross per 24 hour   Intake 1000 ml   Output --   Net 1000 ml         Physical Exam  Vitals and nursing note reviewed.   Constitutional:       General: He is not in acute distress.     Appearance: He is not diaphoretic.   HENT:      Head: Normocephalic.   Eyes:      General: No scleral icterus.        Right eye: No discharge.         Left eye: No discharge.      Conjunctiva/sclera: Conjunctivae normal.   Neck:      Vascular: No JVD.   Cardiovascular:      Rate and Rhythm: Normal rate and regular rhythm.      Heart sounds: Normal heart sounds. No murmur heard.     No friction rub.   Pulmonary:      Effort: Pulmonary effort is normal. No respiratory distress.       Breath sounds: Normal breath sounds. No wheezing.   Abdominal:      General: Bowel sounds are normal. There is no distension.      Palpations: Abdomen is soft.      Tenderness: There is no abdominal tenderness.   Musculoskeletal:         General: Normal range of motion.   Lymphadenopathy:      Cervical: No cervical adenopathy.   Skin:     Findings: No erythema or rash.   Neurological:      Mental Status: He is alert and oriented to person, place, and time.      Deep Tendon Reflexes: Reflexes are normal and symmetric.   Psychiatric:         Behavior: Behavior normal.             Significant Labs: All pertinent labs within the past 24 hours have been reviewed.  BMP:   Recent Labs   Lab 04/30/24  1002         K 3.9      CO2 28   BUN 15   CREATININE 1.3   CALCIUM 8.3*     CBC:   Recent Labs   Lab 04/29/24  2349 04/30/24  0911   WBC 13.63* 7.97   HGB 14.0 12.4*   HCT 39.6* 35.3*    207       Significant Imaging: I have reviewed all pertinent imaging results/findings within the past 24 hours.  I have reviewed and interpreted all pertinent imaging results/findings within the past 24 hours.    MRI Brain Pituitary Without Contrast    Result Date: 4/30/2024  EXAMINATION: MRI BRAIN PITUITARY WITHOUT CONTRAST CLINICAL HISTORY: Pituitary adenoma suspected;. TECHNIQUE: Multiplanar multisequence MR imaging of the brain was performed before and after the administration of 5 mL Gadavist  intravenous contrast using the pituitary protocol. This included acquisitions showing dynamic contrast enhancement of the sella turcica in the coronal plane and a post-contrast T1-weighted sequence. COMPARISON: CT brain dated 04/30/2024 and MRI brain dated 05/09/2022 FINDINGS: Intracranial compartment: Pituitary gland is normal in height, signal, and contour. The pituitary infundibulum is midline without abnormal thickening.  The optic chiasm and orbits are normal.  No discrete sellar or suprasellar lesion identified.  "Ventricles and sulci are normal in size for age without evidence of hydrocephalus. No extra-axial blood or fluid collections. No abnormal intracranial enhancement. Normal vascular flow voids are preserved. Bone marrow signal intensity is normal. Paranasal sinuses and mastoid air cells are essentially clear.     Normal MRI of the pituitary gland Electronically signed by: Donita Urbina Date:    04/30/2024 Time:    07:23    X-Ray Chest AP Portable    Result Date: 4/30/2024  EXAMINATION: XR CHEST AP PORTABLE CLINICAL HISTORY: Provided history is "  Hypotension, unspecified".  Additional history is leukocytosis.  No physician note available in the chart for additional history at the time of dictation. TECHNIQUE: One view of the chest. COMPARISON: None. FINDINGS: Cardiac wires overlie the chest.  Cardiomediastinal silhouette is not enlarged.  Coarse perihilar interstitial lung markings and patchy increased ground-glass/airspace attenuation in the right mid/upper lung.  Unclear if this finding is exaggerated by portable technique and soft tissue attenuation of the x-ray beam.  No other confluent area of consolidation.  No sizable pleural effusion.  No pneumothorax.     Coarse interstitial lung markings and questionable early airspace disease over the right upper lobe.  Suggest correlation for any clinical signs of pneumonia or aspiration.  Please note that this finding could be exaggerated by soft tissue attenuation of the x-ray beam and portable technique.  Follow-up PA and lateral views may provide improved sensitivity if there is clinical uncertainty. Electronically signed by: Agustín Flores MD Date:    04/30/2024 Time:    02:09    CT Head Without Contrast    Result Date: 4/30/2024  EXAMINATION: CT HEAD WITHOUT CONTRAST CLINICAL HISTORY: Syncope, recurrent; TECHNIQUE: Low dose axial CT images obtained throughout the head without intravenous contrast. Sagittal and coronal reconstructions were performed. COMPARISON: " MRI of the brain, 05/09/2022 FINDINGS: Intracranial compartment: Ventricles and sulci are stable in size for age without evidence of hydrocephalus. No extra-axial blood or fluid collections.  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 The brain parenchyma appears stable.  No parenchymal mass, hemorrhage, edema or major vascular distribution infarct. Skull/extracranial contents (limited evaluation): No fracture. Mastoid air cells and paranasal sinuses are essentially clear.     No acute abnormality. Electronically signed by: Reinaldo Calderon Date:    04/30/2024 Time:    00:33    MRI Cervical Spine Without Contrast    Result Date: 4/22/2024  MRI cervical spine without contrast HISTORY: Numbness in index fingers running up arm TECHNIQUE: Sagittal T1, T2, and inversion recovery images of the cervical spine were performed as well as axial T2-weighted images. FINDINGS: Sagittal images demonstrate straightening of the overall cervical lordosis. Alignment, vertebral body height, and marrow signal are maintained. There is no evidence of compression fracture or subluxation. There are anterior and posterior osteophytes of C4, C5, and C6. There is disc space narrowing of C3-4 and C6-7. The cervical spinal cord is normal in shape and signal. Soft tissues are unremarkable. Axial images demonstrate left uncinate process hypertrophy of C2-3 to reduce and mild left neural foraminal narrowing. Posterior osteophyte and left posterior lateral disc bulge of C3-4 produces left anterior thecal sac deformity with severe left neural foraminal narrowing. A broad-based posterior disc bulge of C4-5 produces anterior thecal sac deformity with mild bilateral neural foraminal narrowing. A broad-based posterior disc bulge of C5-6 produces anterior thecal sac deformity with moderate left neural foraminal narrowing. Broad-based disc bulge of C6-7 produces  moderate bilateral neural foraminal narrowing anterior thecal sac deformity. C7-T1 is unremarkable. The cervical spinal cord is normal in signal. Soft tissues are unremarkable.    1. There are anterior and posterior osteophytes of C4, C5, and C6. There is disc space narrowing of C3-4 and C6-7. 2. There is left uncinate process hypertrophy of C2-3 to reduce and mild left neural foraminal narrowing. 3. Posterior osteophyte and left posterior lateral disc bulge of C3-4 produces left anterior thecal sac deformity with severe left neural foraminal narrowing. 4. A broad-based posterior disc bulge of C4-5 produces anterior thecal sac deformity with mild bilateral neural foraminal narrowing. 5. A broad-based posterior disc bulge of C5-6 produces anterior thecal sac deformity with moderate left neural foraminal narrowing. 6. Broad-based disc bulge of C6-7 produces moderate bilateral neural foraminal narrowing anterior thecal sac deformity. Electronically Signed By: Ronald Fox MD 4/22/2024 12:07 PM CDT  - pulls last radiology orders      Assessment/Plan:      * Fatigue    Unclear etiology, may relate to MARIA ELENA noncompliant with CPAP, possible narcolepsy?  Patient also has hypotension, possible?  Adrenal insufficiency  Cortisol level within normal last night, however patient was recently taken p.o. prednisone.   Repeat cortisone level, if high suspicion, may need ACTH stimulation test.   Patient has abnormal chest x-ray, we will get CT chest with IV contrast to rule out malignancy.  Check HIV, hepatitis-C, hepatitis-B, free T4, check testosterone level.   Continue IV hydration  Patient also has RICCARDO      Hypotension due to hypovolemia  Most likely 2/2 poor PO intake, however, with leukocytosis and possible RUL air space disease on CXR (and aspiration risk with hypersomnolence), it is prudent to r/o infxn w/ procal, repeat WBC this AM, and consider Abx. Also get orthostatic VS to ensure he is not still hypovolemic. His initial  hypotension was also likely exacerbated by him taking his home propranolol and amlodipine.      RICCARDO (acute kidney injury)  2/2 hypovolemia and hypotension, reviewed outside records and prior Cr 0.7 on 9/1/21. Avoid NSAIDs, given 1L NS by Dr. Young, and repeat BMP this AM.    Hypersomnolence  Unclear etiology, although it appears acute on chronic w/ recent sleep extending reportedly 24hrs. Patient denies any preceding activities w/ drugs, alcohol, or gabby which would provoke a sleep debt. Instead, it seems more likely that he has untreated MARIA ELENA and chronically has poor sleep, and this stretch may be an acute manifestation of that. It may have led to his low PO intake and RICCARDO/hypotension from hypovolemia. Reviewed outside records and I am not convinced that he has an adrenopituitary axis issue - his cortisol level is essentially wnl tonight, and his prior cosyn stim test was also fairly unremarkable. What is unusual is his ACTH level of 130, and a pituitary MRI is warranted.      MARIA ELENA (obstructive sleep apnea)  Encourage CPAP use and better sleep hygiene. Turn off the TV at night when trying to sleep.        VTE Risk Mitigation (From admission, onward)           Ordered     IP VTE LOW RISK PATIENT  Once         04/30/24 0429     Place sequential compression device  Until discontinued         04/30/24 0429                    Discharge Planning   SEEMA: 5/2/2024     Code Status: Full Code   Is the patient medically ready for discharge?:     Reason for patient still in hospital (select all that apply): Patient trending condition and Treatment                     Suzanne Baker MD  Department of Hospital Medicine   [facility]

## 2024-05-01 PROBLEM — E86.1 HYPOTENSION DUE TO HYPOVOLEMIA: Status: RESOLVED | Noted: 2024-04-30 | Resolved: 2024-05-01

## 2024-05-01 LAB
ACTH PLAS-MCNC: 19.5 PG/ML (ref 7.2–63.3)
ALBUMIN SERPL BCP-MCNC: 4 G/DL (ref 3.5–5.2)
ALP SERPL-CCNC: 46 U/L (ref 55–135)
ALT SERPL W/O P-5'-P-CCNC: 15 U/L (ref 10–44)
ANION GAP SERPL CALC-SCNC: 5 MMOL/L (ref 8–16)
AST SERPL-CCNC: 12 U/L (ref 10–40)
BASOPHILS # BLD AUTO: 0.05 K/UL (ref 0–0.2)
BASOPHILS NFR BLD: 0.9 % (ref 0–1.9)
BILIRUB SERPL-MCNC: 0.5 MG/DL (ref 0.1–1)
BUN SERPL-MCNC: 9 MG/DL (ref 6–20)
CALCIUM SERPL-MCNC: 8.5 MG/DL (ref 8.7–10.5)
CHLORIDE SERPL-SCNC: 104 MMOL/L (ref 95–110)
CO2 SERPL-SCNC: 30 MMOL/L (ref 23–29)
CORTIS SERPL-MCNC: 12.3 UG/DL
CREAT SERPL-MCNC: 0.9 MG/DL (ref 0.5–1.4)
DIFFERENTIAL METHOD BLD: ABNORMAL
EOSINOPHIL # BLD AUTO: 0.1 K/UL (ref 0–0.5)
EOSINOPHIL NFR BLD: 2.4 % (ref 0–8)
ERYTHROCYTE [DISTWIDTH] IN BLOOD BY AUTOMATED COUNT: 12.3 % (ref 11.5–14.5)
EST. GFR  (NO RACE VARIABLE): >60 ML/MIN/1.73 M^2
GLUCOSE SERPL-MCNC: 96 MG/DL (ref 70–110)
HCT VFR BLD AUTO: 37.1 % (ref 40–54)
HGB BLD-MCNC: 13 G/DL (ref 14–18)
IMM GRANULOCYTES # BLD AUTO: 0.04 K/UL (ref 0–0.04)
IMM GRANULOCYTES NFR BLD AUTO: 0.7 % (ref 0–0.5)
LYMPHOCYTES # BLD AUTO: 1.8 K/UL (ref 1–4.8)
LYMPHOCYTES NFR BLD: 30.2 % (ref 18–48)
MCH RBC QN AUTO: 31 PG (ref 27–31)
MCHC RBC AUTO-ENTMCNC: 35 G/DL (ref 32–36)
MCV RBC AUTO: 88 FL (ref 82–98)
MONOCYTES # BLD AUTO: 0.5 K/UL (ref 0.3–1)
MONOCYTES NFR BLD: 9.3 % (ref 4–15)
NEUTROPHILS # BLD AUTO: 3.3 K/UL (ref 1.8–7.7)
NEUTROPHILS NFR BLD: 56.5 % (ref 38–73)
NRBC BLD-RTO: 0 /100 WBC
PLATELET # BLD AUTO: 214 K/UL (ref 150–450)
PMV BLD AUTO: 10.1 FL (ref 9.2–12.9)
POTASSIUM SERPL-SCNC: 4.1 MMOL/L (ref 3.5–5.1)
PROT SERPL-MCNC: 6.2 G/DL (ref 6–8.4)
RBC # BLD AUTO: 4.2 M/UL (ref 4.6–6.2)
SODIUM SERPL-SCNC: 139 MMOL/L (ref 136–145)
T4 FREE SERPL-MCNC: 0.62 NG/DL (ref 0.71–1.51)
WBC # BLD AUTO: 5.82 K/UL (ref 3.9–12.7)

## 2024-05-01 PROCEDURE — 99900035 HC TECH TIME PER 15 MIN (STAT)

## 2024-05-01 PROCEDURE — 85025 COMPLETE CBC W/AUTO DIFF WBC: CPT | Performed by: HOSPITALIST

## 2024-05-01 PROCEDURE — 82024 ASSAY OF ACTH: CPT | Performed by: HOSPITALIST

## 2024-05-01 PROCEDURE — 84439 ASSAY OF FREE THYROXINE: CPT | Performed by: HOSPITALIST

## 2024-05-01 PROCEDURE — 94761 N-INVAS EAR/PLS OXIMETRY MLT: CPT

## 2024-05-01 PROCEDURE — 25000003 PHARM REV CODE 250: Performed by: HOSPITALIST

## 2024-05-01 PROCEDURE — 96374 THER/PROPH/DIAG INJ IV PUSH: CPT

## 2024-05-01 PROCEDURE — 36415 COLL VENOUS BLD VENIPUNCTURE: CPT | Performed by: HOSPITALIST

## 2024-05-01 PROCEDURE — 82533 TOTAL CORTISOL: CPT | Performed by: HOSPITALIST

## 2024-05-01 PROCEDURE — 94760 N-INVAS EAR/PLS OXIMETRY 1: CPT

## 2024-05-01 PROCEDURE — 63600175 PHARM REV CODE 636 W HCPCS: Performed by: HOSPITALIST

## 2024-05-01 PROCEDURE — 80053 COMPREHEN METABOLIC PANEL: CPT | Performed by: HOSPITALIST

## 2024-05-01 PROCEDURE — 12000002 HC ACUTE/MED SURGE SEMI-PRIVATE ROOM

## 2024-05-01 RX ORDER — PREDNISONE 5 MG/1
10 TABLET ORAL ONCE
Status: COMPLETED | OUTPATIENT
Start: 2024-05-01 | End: 2024-05-01

## 2024-05-01 RX ORDER — PREDNISONE 5 MG/1
5 TABLET ORAL DAILY
Status: DISCONTINUED | OUTPATIENT
Start: 2024-05-02 | End: 2024-05-02 | Stop reason: HOSPADM

## 2024-05-01 RX ORDER — COSYNTROPIN 0.25 MG/ML
0.25 INJECTION, POWDER, FOR SOLUTION INTRAMUSCULAR; INTRAVENOUS ONCE
Status: COMPLETED | OUTPATIENT
Start: 2024-05-01 | End: 2024-05-01

## 2024-05-01 RX ADMIN — PREDNISONE 10 MG: 5 TABLET ORAL at 01:05

## 2024-05-01 RX ADMIN — ESCITALOPRAM OXALATE 20 MG: 10 TABLET ORAL at 08:05

## 2024-05-01 RX ADMIN — COSYNTROPIN 0.25 MG: 0.25 INJECTION, POWDER, LYOPHILIZED, FOR SOLUTION INTRAMUSCULAR; INTRAVENOUS at 08:05

## 2024-05-01 NOTE — ASSESSMENT & PLAN NOTE
Unclear etiology, may relate to MARIA ELENA noncompliant with CPAP, possible narcolepsy?  Patient also has hypotension, possible?  Adrenal insufficiency  Cortisol level within normal last night, however patient was recently taken p.o. prednisone.   Repeat cortisol level is low, pending ACTH stimulation test.   Patient has abnormal chest x-ray, we will get CT chest with IV contrast to rule out malignancy- no malignancy.   Follow-up HIV, hepatitis-C, hepatitis-B, free T4, check testosterone level.   Continue IV hydration  Patient also has RICCARDO

## 2024-05-01 NOTE — PROGRESS NOTES
[facility] Medicine  Progress Note    Patient Name: Raúl Hastings  MRN: [id number]  Patient Class: OP- Observation   Admission Date: 4/29/2024  Length of Stay: 0 days  Attending Physician: Suzanne Baker MD  Primary Care Provider: Mary, Primary Doctor        Subjective:     Principal Problem:Fatigue        HPI:  48M p/w hypersomnolence. He reports that he had an uneventful weekend and went to bed at 8p on 4/28. He slept until around 8p 4/29. When he awoke, he reports that he had RUE paresthesias, dizziness, and anorexia. He went to use the restroom and felt unsteady, defecating on the floor by accident. This is unusual for the patient. However, hypersomnolence is not new for the patient; he reports years of experiencing inexplicable fatigue. He has had 2 sleep studies, and has been told he has MARIA ELENA and narcolepsy. He has been prescribed CPAP, but only occasionally uses it (and he did not during this preceding weekend). He uses AM adderall to help wake up. He has chronic pain and his pain provider encourages use of medical THC, and he has PRN back-up opiates. The patient denies over-use of opiates or THC during this preceding weekend. The patient did recently have labs performed by this endocrinologist, and he has an elevated ACTH (130, but has previously been 60-70 over past 7 years). Consequently, she was going to get repeat pituitary MRI (last one 2 years ago), but the patient missed his 4/29 appt w/ her.    Overview/Hospital Course:  No notes on file    Interval History:   48-year-old  male with history of ?  Elevated ACTH, MARIA ELENA noncompliant with CPAP, hypertension presenting here for generalized weakness, severe fatigue, 4 extremities paresthesia.     Seen In the multidisciplinary rounds, feel much better, no fever or chills, blood pressure much improved.  Cosyntropin stimulation test is ongoing.  CT reviewed with the patient.       Review of Systems   Constitutional:  Positive for  fatigue and unexpected weight change. Negative for chills and fever.   Respiratory:  Negative for cough and shortness of breath.    Cardiovascular:  Negative for chest pain and leg swelling.   Gastrointestinal:  Negative for abdominal pain, nausea and vomiting.   Musculoskeletal:  Negative for back pain.   Neurological:  Positive for weakness.   Psychiatric/Behavioral:  Negative for confusion. The patient is not nervous/anxious.    All other systems reviewed and are negative.    Objective:     Vital Signs (Most Recent):  Temp: 98.9 °F (37.2 °C) (05/01/24 0811)  Pulse: 80 (05/01/24 0900)  Resp: 18 (05/01/24 0900)  BP: (!) 143/79 (05/01/24 0811)  SpO2: 95 % (05/01/24 0900) Vital Signs (24h Range):  Temp:  [98.5 °F (36.9 °C)-98.9 °F (37.2 °C)] 98.9 °F (37.2 °C)  Pulse:  [78-88] 80  Resp:  [17-18] 18  SpO2:  [95 %-98 %] 95 %  BP: (118-143)/(59-79) 143/79     Weight: 95.8 kg (211 lb 3.2 oz)  Body mass index is 28.64 kg/m².    Intake/Output Summary (Last 24 hours) at 5/1/2024 1123  Last data filed at 5/1/2024 0504  Gross per 24 hour   Intake 240 ml   Output 2200 ml   Net -1960 ml         Physical Exam  Vitals and nursing note reviewed.   Constitutional:       General: He is not in acute distress.     Appearance: He is not diaphoretic.   HENT:      Head: Normocephalic.   Eyes:      General: No scleral icterus.        Right eye: No discharge.         Left eye: No discharge.      Conjunctiva/sclera: Conjunctivae normal.   Neck:      Vascular: No JVD.   Cardiovascular:      Rate and Rhythm: Normal rate and regular rhythm.      Heart sounds: Normal heart sounds. No murmur heard.     No friction rub.   Pulmonary:      Effort: Pulmonary effort is normal. No respiratory distress.      Breath sounds: Normal breath sounds. No wheezing.   Abdominal:      General: Bowel sounds are normal. There is no distension.      Palpations: Abdomen is soft.      Tenderness: There is no abdominal tenderness.   Musculoskeletal:         General:  Normal range of motion.   Lymphadenopathy:      Cervical: No cervical adenopathy.   Skin:     Findings: No erythema or rash.   Neurological:      Mental Status: He is alert and oriented to person, place, and time.      Deep Tendon Reflexes: Reflexes are normal and symmetric.   Psychiatric:         Behavior: Behavior normal.             Significant Labs: All pertinent labs within the past 24 hours have been reviewed.  BMP:   Recent Labs   Lab 05/01/24  0559   GLU 96      K 4.1      CO2 30*   BUN 9   CREATININE 0.9   CALCIUM 8.5*     CBC:   Recent Labs   Lab 04/29/24  2349 04/30/24  0911 05/01/24  0559   WBC 13.63* 7.97 5.82   HGB 14.0 12.4* 13.0*   HCT 39.6* 35.3* 37.1*    207 214       Significant Imaging: I have reviewed all pertinent imaging results/findings within the past 24 hours.  I have reviewed and interpreted all pertinent imaging results/findings within the past 24 hours.    MRI Brain Pituitary Without Contrast    Result Date: 4/30/2024  EXAMINATION: MRI BRAIN PITUITARY WITHOUT CONTRAST CLINICAL HISTORY: Pituitary adenoma suspected;. TECHNIQUE: Multiplanar multisequence MR imaging of the brain was performed before and after the administration of 5 mL Gadavist  intravenous contrast using the pituitary protocol. This included acquisitions showing dynamic contrast enhancement of the sella turcica in the coronal plane and a post-contrast T1-weighted sequence. COMPARISON: CT brain dated 04/30/2024 and MRI brain dated 05/09/2022 FINDINGS: Intracranial compartment: Pituitary gland is normal in height, signal, and contour. The pituitary infundibulum is midline without abnormal thickening.  The optic chiasm and orbits are normal.  No discrete sellar or suprasellar lesion identified. Ventricles and sulci are normal in size for age without evidence of hydrocephalus. No extra-axial blood or fluid collections. No abnormal intracranial enhancement. Normal vascular flow voids are preserved. Bone marrow  "signal intensity is normal. Paranasal sinuses and mastoid air cells are essentially clear.     Normal MRI of the pituitary gland Electronically signed by: Donita Urbina Date:    04/30/2024 Time:    07:23    X-Ray Chest AP Portable    Result Date: 4/30/2024  EXAMINATION: XR CHEST AP PORTABLE CLINICAL HISTORY: Provided history is "  Hypotension, unspecified".  Additional history is leukocytosis.  No physician note available in the chart for additional history at the time of dictation. TECHNIQUE: One view of the chest. COMPARISON: None. FINDINGS: Cardiac wires overlie the chest.  Cardiomediastinal silhouette is not enlarged.  Coarse perihilar interstitial lung markings and patchy increased ground-glass/airspace attenuation in the right mid/upper lung.  Unclear if this finding is exaggerated by portable technique and soft tissue attenuation of the x-ray beam.  No other confluent area of consolidation.  No sizable pleural effusion.  No pneumothorax.     Coarse interstitial lung markings and questionable early airspace disease over the right upper lobe.  Suggest correlation for any clinical signs of pneumonia or aspiration.  Please note that this finding could be exaggerated by soft tissue attenuation of the x-ray beam and portable technique.  Follow-up PA and lateral views may provide improved sensitivity if there is clinical uncertainty. Electronically signed by: Agustín Flores MD Date:    04/30/2024 Time:    02:09    CT Head Without Contrast    Result Date: 4/30/2024  EXAMINATION: CT HEAD WITHOUT CONTRAST CLINICAL HISTORY: Syncope, recurrent; TECHNIQUE: Low dose axial CT images obtained throughout the head without intravenous contrast. Sagittal and coronal reconstructions were performed. COMPARISON: MRI of the brain, 05/09/2022 FINDINGS: Intracranial compartment: Ventricles and sulci are stable in size for age without evidence of hydrocephalus. No extra-axial blood or fluid collections.  Mild dolichoectasia of the " right vertebral and basilar artery appear stable allowing for changes in technique with CT compared to MR. The brain parenchyma appears stable.  No parenchymal mass, hemorrhage, edema or major vascular distribution infarct. Skull/extracranial contents (limited evaluation): No fracture. Mastoid air cells and paranasal sinuses are essentially clear.     No acute abnormality. Electronically signed by: Reinaldo Calderon Date:    04/30/2024 Time:    00:33    MRI Cervical Spine Without Contrast    Result Date: 4/22/2024  MRI cervical spine without contrast HISTORY: Numbness in index fingers running up arm TECHNIQUE: Sagittal T1, T2, and inversion recovery images of the cervical spine were performed as well as axial T2-weighted images. FINDINGS: Sagittal images demonstrate straightening of the overall cervical lordosis. Alignment, vertebral body height, and marrow signal are maintained. There is no evidence of compression fracture or subluxation. There are anterior and posterior osteophytes of C4, C5, and C6. There is disc space narrowing of C3-4 and C6-7. The cervical spinal cord is normal in shape and signal. Soft tissues are unremarkable. Axial images demonstrate left uncinate process hypertrophy of C2-3 to reduce and mild left neural foraminal narrowing. Posterior osteophyte and left posterior lateral disc bulge of C3-4 produces left anterior thecal sac deformity with severe left neural foraminal narrowing. A broad-based posterior disc bulge of C4-5 produces anterior thecal sac deformity with mild bilateral neural foraminal narrowing. A broad-based posterior disc bulge of C5-6 produces anterior thecal sac deformity with moderate left neural foraminal narrowing. Broad-based disc bulge of C6-7 produces moderate bilateral neural foraminal narrowing anterior thecal sac deformity. C7-T1 is unremarkable. The cervical spinal cord is normal in signal. Soft tissues are unremarkable.    1. There are anterior and posterior  osteophytes of C4, C5, and C6. There is disc space narrowing of C3-4 and C6-7. 2. There is left uncinate process hypertrophy of C2-3 to reduce and mild left neural foraminal narrowing. 3. Posterior osteophyte and left posterior lateral disc bulge of C3-4 produces left anterior thecal sac deformity with severe left neural foraminal narrowing. 4. A broad-based posterior disc bulge of C4-5 produces anterior thecal sac deformity with mild bilateral neural foraminal narrowing. 5. A broad-based posterior disc bulge of C5-6 produces anterior thecal sac deformity with moderate left neural foraminal narrowing. 6. Broad-based disc bulge of C6-7 produces moderate bilateral neural foraminal narrowing anterior thecal sac deformity. Electronically Signed By: Ronald Fox MD 4/22/2024 12:07 PM CDT  - pulls last radiology orders      Assessment/Plan:      * Fatigue    Unclear etiology, may relate to MARIA ELENA noncompliant with CPAP, possible narcolepsy?  Patient also has hypotension, possible?  Adrenal insufficiency  Cortisol level within normal last night, however patient was recently taken p.o. prednisone.   Repeat cortisol level is low, pending ACTH stimulation test.   Patient has abnormal chest x-ray, we will get CT chest with IV contrast to rule out malignancy- no malignancy.   Follow-up HIV, hepatitis-C, hepatitis-B, free T4, check testosterone level.   Continue IV hydration  Patient also has RICCARDO      RICCARDO (acute kidney injury)  2/2 hypovolemia and hypotension, reviewed outside records and prior Cr 0.7 on 9/1/21. Avoid NSAIDs, given 1L NS by Dr. Young, and repeat BMP this AM.    Hypersomnolence  Unclear etiology, although it appears acute on chronic w/ recent sleep extending reportedly 24hrs. Patient denies any preceding activities w/ drugs, alcohol, or gabby which would provoke a sleep debt. Instead, it seems more likely that he has untreated MARIA ELENA and chronically has poor sleep, and this stretch may be an acute manifestation of  that. It may have led to his low PO intake and RICCARDO/hypotension from hypovolemia. Reviewed outside records and I am not convinced that he has an adrenopituitary axis issue - his cortisol level is essentially wnl tonight, and his prior cosyn stim test was also fairly unremarkable. What is unusual is his ACTH level of 130, and a pituitary MRI is warranted.      MARIA ELENA (obstructive sleep apnea)  Encourage CPAP use and better sleep hygiene. Turn off the TV at night when trying to sleep.        VTE Risk Mitigation (From admission, onward)           Ordered     IP VTE LOW RISK PATIENT  Once         04/30/24 0429     Place sequential compression device  Until discontinued         04/30/24 0429                    Discharge Planning   SEEMA: 5/2/2024     Code Status: Full Code   Is the patient medically ready for discharge?:     Reason for patient still in hospital (select all that apply): Patient trending condition and Treatment  Discharge Plan A: Home                  Suzanne Baker MD  Department of Hospital Medicine   [facility]

## 2024-05-01 NOTE — SUBJECTIVE & OBJECTIVE
Interval History:   48-year-old  male with history of ?  Elevated ACTH, MARIA ELENA noncompliant with CPAP, hypertension presenting here for generalized weakness, severe fatigue, 4 extremities paresthesia.     Seen In the multidisciplinary rounds, feel much better, no fever or chills, blood pressure much improved.  Cosyntropin stimulation test is ongoing.  CT reviewed with the patient.       Review of Systems   Constitutional:  Positive for fatigue and unexpected weight change. Negative for chills and fever.   Respiratory:  Negative for cough and shortness of breath.    Cardiovascular:  Negative for chest pain and leg swelling.   Gastrointestinal:  Negative for abdominal pain, nausea and vomiting.   Musculoskeletal:  Negative for back pain.   Neurological:  Positive for weakness.   Psychiatric/Behavioral:  Negative for confusion. The patient is not nervous/anxious.    All other systems reviewed and are negative.    Objective:     Vital Signs (Most Recent):  Temp: 98.9 °F (37.2 °C) (05/01/24 0811)  Pulse: 80 (05/01/24 0900)  Resp: 18 (05/01/24 0900)  BP: (!) 143/79 (05/01/24 0811)  SpO2: 95 % (05/01/24 0900) Vital Signs (24h Range):  Temp:  [98.5 °F (36.9 °C)-98.9 °F (37.2 °C)] 98.9 °F (37.2 °C)  Pulse:  [78-88] 80  Resp:  [17-18] 18  SpO2:  [95 %-98 %] 95 %  BP: (118-143)/(59-79) 143/79     Weight: 95.8 kg (211 lb 3.2 oz)  Body mass index is 28.64 kg/m².    Intake/Output Summary (Last 24 hours) at 5/1/2024 1123  Last data filed at 5/1/2024 0504  Gross per 24 hour   Intake 240 ml   Output 2200 ml   Net -1960 ml         Physical Exam  Vitals and nursing note reviewed.   Constitutional:       General: He is not in acute distress.     Appearance: He is not diaphoretic.   HENT:      Head: Normocephalic.   Eyes:      General: No scleral icterus.        Right eye: No discharge.         Left eye: No discharge.      Conjunctiva/sclera: Conjunctivae normal.   Neck:      Vascular: No JVD.   Cardiovascular:      Rate and  Rhythm: Normal rate and regular rhythm.      Heart sounds: Normal heart sounds. No murmur heard.     No friction rub.   Pulmonary:      Effort: Pulmonary effort is normal. No respiratory distress.      Breath sounds: Normal breath sounds. No wheezing.   Abdominal:      General: Bowel sounds are normal. There is no distension.      Palpations: Abdomen is soft.      Tenderness: There is no abdominal tenderness.   Musculoskeletal:         General: Normal range of motion.   Lymphadenopathy:      Cervical: No cervical adenopathy.   Skin:     Findings: No erythema or rash.   Neurological:      Mental Status: He is alert and oriented to person, place, and time.      Deep Tendon Reflexes: Reflexes are normal and symmetric.   Psychiatric:         Behavior: Behavior normal.             Significant Labs: All pertinent labs within the past 24 hours have been reviewed.  BMP:   Recent Labs   Lab 05/01/24  0559   GLU 96      K 4.1      CO2 30*   BUN 9   CREATININE 0.9   CALCIUM 8.5*     CBC:   Recent Labs   Lab 04/29/24  2349 04/30/24  0911 05/01/24  0559   WBC 13.63* 7.97 5.82   HGB 14.0 12.4* 13.0*   HCT 39.6* 35.3* 37.1*    207 214       Significant Imaging: I have reviewed all pertinent imaging results/findings within the past 24 hours.  I have reviewed and interpreted all pertinent imaging results/findings within the past 24 hours.    MRI Brain Pituitary Without Contrast    Result Date: 4/30/2024  EXAMINATION: MRI BRAIN PITUITARY WITHOUT CONTRAST CLINICAL HISTORY: Pituitary adenoma suspected;. TECHNIQUE: Multiplanar multisequence MR imaging of the brain was performed before and after the administration of 5 mL Gadavist  intravenous contrast using the pituitary protocol. This included acquisitions showing dynamic contrast enhancement of the sella turcica in the coronal plane and a post-contrast T1-weighted sequence. COMPARISON: CT brain dated 04/30/2024 and MRI brain dated 05/09/2022 FINDINGS: Intracranial  "compartment: Pituitary gland is normal in height, signal, and contour. The pituitary infundibulum is midline without abnormal thickening.  The optic chiasm and orbits are normal.  No discrete sellar or suprasellar lesion identified. Ventricles and sulci are normal in size for age without evidence of hydrocephalus. No extra-axial blood or fluid collections. No abnormal intracranial enhancement. Normal vascular flow voids are preserved. Bone marrow signal intensity is normal. Paranasal sinuses and mastoid air cells are essentially clear.     Normal MRI of the pituitary gland Electronically signed by: Donita Urbina Date:    04/30/2024 Time:    07:23    X-Ray Chest AP Portable    Result Date: 4/30/2024  EXAMINATION: XR CHEST AP PORTABLE CLINICAL HISTORY: Provided history is "  Hypotension, unspecified".  Additional history is leukocytosis.  No physician note available in the chart for additional history at the time of dictation. TECHNIQUE: One view of the chest. COMPARISON: None. FINDINGS: Cardiac wires overlie the chest.  Cardiomediastinal silhouette is not enlarged.  Coarse perihilar interstitial lung markings and patchy increased ground-glass/airspace attenuation in the right mid/upper lung.  Unclear if this finding is exaggerated by portable technique and soft tissue attenuation of the x-ray beam.  No other confluent area of consolidation.  No sizable pleural effusion.  No pneumothorax.     Coarse interstitial lung markings and questionable early airspace disease over the right upper lobe.  Suggest correlation for any clinical signs of pneumonia or aspiration.  Please note that this finding could be exaggerated by soft tissue attenuation of the x-ray beam and portable technique.  Follow-up PA and lateral views may provide improved sensitivity if there is clinical uncertainty. Electronically signed by: Agustín Flores MD Date:    04/30/2024 Time:    02:09    CT Head Without Contrast    Result Date: " 4/30/2024  EXAMINATION: CT HEAD WITHOUT CONTRAST CLINICAL HISTORY: Syncope, recurrent; TECHNIQUE: Low dose axial CT images obtained throughout the head without intravenous contrast. Sagittal and coronal reconstructions were performed. COMPARISON: MRI of the brain, 05/09/2022 FINDINGS: Intracranial compartment: Ventricles and sulci are stable in size for age without evidence of hydrocephalus. No extra-axial blood or fluid collections.  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 The brain parenchyma appears stable.  No parenchymal mass, hemorrhage, edema or major vascular distribution infarct. Skull/extracranial contents (limited evaluation): No fracture. Mastoid air cells and paranasal sinuses are essentially clear.     No acute abnormality. Electronically signed by: Reinaldo Calderon Date:    04/30/2024 Time:    00:33    MRI Cervical Spine Without Contrast    Result Date: 4/22/2024  MRI cervical spine without contrast HISTORY: Numbness in index fingers running up arm TECHNIQUE: Sagittal T1, T2, and inversion recovery images of the cervical spine were performed as well as axial T2-weighted images. FINDINGS: Sagittal images demonstrate straightening of the overall cervical lordosis. Alignment, vertebral body height, and marrow signal are maintained. There is no evidence of compression fracture or subluxation. There are anterior and posterior osteophytes of C4, C5, and C6. There is disc space narrowing of C3-4 and C6-7. The cervical spinal cord is normal in shape and signal. Soft tissues are unremarkable. Axial images demonstrate left uncinate process hypertrophy of C2-3 to reduce and mild left neural foraminal narrowing. Posterior osteophyte and left posterior lateral disc bulge of C3-4 produces left anterior thecal sac deformity with severe left neural foraminal narrowing. A broad-based posterior disc bulge of C4-5 produces  anterior thecal sac deformity with mild bilateral neural foraminal narrowing. A broad-based posterior disc bulge of C5-6 produces anterior thecal sac deformity with moderate left neural foraminal narrowing. Broad-based disc bulge of C6-7 produces moderate bilateral neural foraminal narrowing anterior thecal sac deformity. C7-T1 is unremarkable. The cervical spinal cord is normal in signal. Soft tissues are unremarkable.    1. There are anterior and posterior osteophytes of C4, C5, and C6. There is disc space narrowing of C3-4 and C6-7. 2. There is left uncinate process hypertrophy of C2-3 to reduce and mild left neural foraminal narrowing. 3. Posterior osteophyte and left posterior lateral disc bulge of C3-4 produces left anterior thecal sac deformity with severe left neural foraminal narrowing. 4. A broad-based posterior disc bulge of C4-5 produces anterior thecal sac deformity with mild bilateral neural foraminal narrowing. 5. A broad-based posterior disc bulge of C5-6 produces anterior thecal sac deformity with moderate left neural foraminal narrowing. 6. Broad-based disc bulge of C6-7 produces moderate bilateral neural foraminal narrowing anterior thecal sac deformity. Electronically Signed By: Ronald Fox MD 4/22/2024 12:07 PM CDT  - pulls last radiology orders

## 2024-05-01 NOTE — PLAN OF CARE
Problem: Acute Kidney Injury/Impairment  Goal: Fluid and Electrolyte Balance  Outcome: Progressing     Problem: Acute Kidney Injury/Impairment  Goal: Improved Oral Intake  Outcome: Progressing     Problem: Acute Kidney Injury/Impairment  Goal: Effective Renal Function  Outcome: Progressing

## 2024-05-01 NOTE — RESPIRATORY THERAPY
05/01/24 0900   Patient Assessment/Suction   Level of Consciousness (AVPU) alert   Respiratory Effort Normal;Unlabored   Expansion/Accessory Muscles/Retractions no use of accessory muscles;no retractions;expansion symmetric   All Lung Fields Breath Sounds clear;diminished   Rhythm/Pattern, Respiratory unlabored;pattern regular;depth regular   Cough Frequency no cough   PRE-TX-O2   Device (Oxygen Therapy) room air   SpO2 95 %   Pulse Oximetry Type Intermittent   $ Pulse Oximetry - Multiple Charge Pulse Oximetry - Multiple   Pulse 80   Resp 18   Preset CPAP/BiPAP Settings   $ Is patient using? No/refused

## 2024-05-02 VITALS
DIASTOLIC BLOOD PRESSURE: 77 MMHG | RESPIRATION RATE: 19 BRPM | HEIGHT: 72 IN | HEART RATE: 93 BPM | OXYGEN SATURATION: 98 % | BODY MASS INDEX: 28.6 KG/M2 | TEMPERATURE: 99 F | SYSTOLIC BLOOD PRESSURE: 122 MMHG | WEIGHT: 211.19 LBS

## 2024-05-02 LAB
ALBUMIN SERPL BCP-MCNC: 4.4 G/DL (ref 3.5–5.2)
ALP SERPL-CCNC: 47 U/L (ref 55–135)
ALT SERPL W/O P-5'-P-CCNC: 14 U/L (ref 10–44)
ANION GAP SERPL CALC-SCNC: 9 MMOL/L (ref 8–16)
AST SERPL-CCNC: 11 U/L (ref 10–40)
BASOPHILS # BLD AUTO: 0.04 K/UL (ref 0–0.2)
BASOPHILS NFR BLD: 0.5 % (ref 0–1.9)
BILIRUB SERPL-MCNC: 0.7 MG/DL (ref 0.1–1)
BUN SERPL-MCNC: 10 MG/DL (ref 6–20)
CALCIUM SERPL-MCNC: 9 MG/DL (ref 8.7–10.5)
CHLORIDE SERPL-SCNC: 102 MMOL/L (ref 95–110)
CO2 SERPL-SCNC: 27 MMOL/L (ref 23–29)
CREAT SERPL-MCNC: 0.8 MG/DL (ref 0.5–1.4)
DIFFERENTIAL METHOD BLD: ABNORMAL
EOSINOPHIL # BLD AUTO: 0 K/UL (ref 0–0.5)
EOSINOPHIL NFR BLD: 0 % (ref 0–8)
ERYTHROCYTE [DISTWIDTH] IN BLOOD BY AUTOMATED COUNT: 11.9 % (ref 11.5–14.5)
EST. GFR  (NO RACE VARIABLE): >60 ML/MIN/1.73 M^2
GLUCOSE SERPL-MCNC: 96 MG/DL (ref 70–110)
HBV SURFACE AG SERPL QL IA: NEGATIVE
HCT VFR BLD AUTO: 38 % (ref 40–54)
HCV AB S/CO SERPL IA: NON REACTIVE
HGB BLD-MCNC: 13.6 G/DL (ref 14–18)
HIV 1+2 AB+HIV1 P24 AG SERPL QL IA: NON REACTIVE
IMM GRANULOCYTES # BLD AUTO: 0.04 K/UL (ref 0–0.04)
IMM GRANULOCYTES NFR BLD AUTO: 0.5 % (ref 0–0.5)
LYMPHOCYTES # BLD AUTO: 1.6 K/UL (ref 1–4.8)
LYMPHOCYTES NFR BLD: 21.1 % (ref 18–48)
MCH RBC QN AUTO: 31.2 PG (ref 27–31)
MCHC RBC AUTO-ENTMCNC: 35.8 G/DL (ref 32–36)
MCV RBC AUTO: 87 FL (ref 82–98)
MONOCYTES # BLD AUTO: 0.6 K/UL (ref 0.3–1)
MONOCYTES NFR BLD: 7.9 % (ref 4–15)
NEUTROPHILS # BLD AUTO: 5.3 K/UL (ref 1.8–7.7)
NEUTROPHILS NFR BLD: 70 % (ref 38–73)
NRBC BLD-RTO: 0 /100 WBC
PLATELET # BLD AUTO: 230 K/UL (ref 150–450)
PMV BLD AUTO: 10.1 FL (ref 9.2–12.9)
POTASSIUM SERPL-SCNC: 4.1 MMOL/L (ref 3.5–5.1)
PROT SERPL-MCNC: 6.8 G/DL (ref 6–8.4)
RBC # BLD AUTO: 4.36 M/UL (ref 4.6–6.2)
SODIUM SERPL-SCNC: 138 MMOL/L (ref 136–145)
WBC # BLD AUTO: 7.58 K/UL (ref 3.9–12.7)

## 2024-05-02 PROCEDURE — 25000003 PHARM REV CODE 250: Performed by: HOSPITALIST

## 2024-05-02 PROCEDURE — 80053 COMPREHEN METABOLIC PANEL: CPT | Performed by: HOSPITALIST

## 2024-05-02 PROCEDURE — 85025 COMPLETE CBC W/AUTO DIFF WBC: CPT | Performed by: HOSPITALIST

## 2024-05-02 PROCEDURE — 36415 COLL VENOUS BLD VENIPUNCTURE: CPT | Performed by: HOSPITALIST

## 2024-05-02 PROCEDURE — 63600175 PHARM REV CODE 636 W HCPCS: Performed by: HOSPITALIST

## 2024-05-02 RX ORDER — OXYCODONE HYDROCHLORIDE 10 MG/1
10 TABLET ORAL EVERY 6 HOURS PRN
Status: DISCONTINUED | OUTPATIENT
Start: 2024-05-02 | End: 2024-05-02 | Stop reason: HOSPADM

## 2024-05-02 RX ORDER — PREDNISONE 5 MG/1
5 TABLET ORAL DAILY
Qty: 90 TABLET | Refills: 0 | Status: SHIPPED | OUTPATIENT
Start: 2024-05-03 | End: 2024-08-01

## 2024-05-02 RX ADMIN — HYDROCODONE BITARTRATE AND ACETAMINOPHEN 1 TABLET: 5; 325 TABLET ORAL at 05:05

## 2024-05-02 RX ADMIN — PREDNISONE 5 MG: 5 TABLET ORAL at 08:05

## 2024-05-02 RX ADMIN — ESCITALOPRAM OXALATE 20 MG: 10 TABLET ORAL at 08:05

## 2024-05-02 RX ADMIN — OXYCODONE HYDROCHLORIDE 10 MG: 10 TABLET ORAL at 08:05

## 2024-05-02 NOTE — PLAN OF CARE
Notified by nursing that pt states he is ready to discharge and has a place to discharge to.  SW taking pt resources prior to discharge.  Discharge orders and chart reviewed with no further post-acute discharge needs identified at this time.  At this time, patient is cleared for discharge from Case Management standpoint.        05/02/24 1133   Final Note   Assessment Type Final Discharge Note   Anticipated Discharge Disposition Home   Post-Acute Status   Post-Acute Authorization Other   Other Status No Post-Acute Service Needs   Discharge Delays None known at this time

## 2024-05-02 NOTE — PLAN OF CARE
Problem: Acute Kidney Injury/Impairment  Goal: Fluid and Electrolyte Balance  Outcome: Progressing     Problem: Acute Kidney Injury/Impairment  Goal: Improved Oral Intake  Outcome: Progressing

## 2024-05-02 NOTE — PROGRESS NOTES
Discharge instructions given to pt. Pt verbalized understanding. PIV removed. Pt leaving with personal belongings. Meds to be picked up from pharmacy. Pt leaving via personal vehicle.

## 2024-05-02 NOTE — HOSPITAL COURSE
48-year-old  male with history of ? Elevated ACTH, MARIA ELENA noncompliant with CPAP, hypertension presenting here for generalized weakness, severe fatigue, 4 extremities paresthesia.  Patient admitted for further management, patient had MRI of the brain patient is negative for acute changes, patient has persistent hypotension upon admission which responded to the IV fluids.  Patient initially has cortisol level checked at nighttime which is normal, repeat cortisone level in the early morning shows only 3.5, highly suspect patient has adrenal insufficiency, so we attempted to do a cosyntropin stimulation test in hospital, however ACTH was given in the hospital but 30 minutes and 60 minutes of cortisone was never drawn, nevertheless, patient is started on low-dose prednisone and responded very well,  highly suspect patient may have primary adrenal insufficiency.  Patient will be referred to outpatient endocrinologist for further management.   Patient also has found to have left upper lobe nodules, need outpatient follow-up .

## 2024-05-02 NOTE — PLAN OF CARE
Problem: Adult Inpatient Plan of Care  Goal: Plan of Care Review  Outcome: Met  Goal: Patient-Specific Goal (Individualized)  Outcome: Met  Goal: Absence of Hospital-Acquired Illness or Injury  Outcome: Met  Goal: Optimal Comfort and Wellbeing  Outcome: Met  Goal: Readiness for Transition of Care  Outcome: Met     Problem: Acute Kidney Injury/Impairment  Goal: Fluid and Electrolyte Balance  Outcome: Met  Goal: Improved Oral Intake  Outcome: Met  Goal: Effective Renal Function  Outcome: Met

## 2024-05-02 NOTE — CARE UPDATE
Patient has home cpap but not the power cable.  Pt. Is refusing inhouse cpap machine.     05/01/24 1925   Patient Assessment/Suction   Level of Consciousness (AVPU) alert   Respiratory Effort Normal;Unlabored   Expansion/Accessory Muscles/Retractions no use of accessory muscles;no retractions;expansion symmetric   All Lung Fields Breath Sounds clear;equal bilaterally   Rhythm/Pattern, Respiratory unlabored;pattern regular   Cough Frequency no cough   PRE-TX-O2   Device (Oxygen Therapy) room air   SpO2 95 %   Pulse Oximetry Type Intermittent   $ Pulse Oximetry - Single Charge Pulse Oximetry - Single   Pulse 86   Resp 18   Temp 98.6 °F (37 °C)   /80   Preset CPAP/BiPAP Settings   Mode Of Delivery other (see comments)  (not wearing home cpap)

## 2024-05-02 NOTE — PLAN OF CARE
Pt told SHANE Kyle CM that sister will not allow him to return to her home and later informed nurse that he has a place to go.  SW met with pt and gave housing and homeless resources as a backup plan.   Pt acknowledged understanding and expressed appreciation.     05/02/24 1140   Post-Acute Status   Hospital Resources/Appts/Education Provided Community resources provided

## 2024-05-02 NOTE — DISCHARGE SUMMARY
[facility]  Discharge Summary      Patient Name: Raúl Hastings  MRN: [id number]  TERRENCE: [id number]  Patient Class: IP- Inpatient  Admission Date: 4/29/2024  Hospital Length of Stay: 1 days  Discharge Date and Time: 5/2/2024 12:28 PM  Attending Physician: Mary att. providers found   Discharging Provider: Suzanne Baker MD  Primary Care Provider: Mary, Primary Doctor    Primary Care Team: Networked reference to record PCT     HPI:   48M p/w hypersomnolence. He reports that he had an uneventful weekend and went to bed at 8p on 4/28. He slept until around 8p 4/29. When he awoke, he reports that he had RUE paresthesias, dizziness, and anorexia. He went to use the restroom and felt unsteady, defecating on the floor by accident. This is unusual for the patient. However, hypersomnolence is not new for the patient; he reports years of experiencing inexplicable fatigue. He has had 2 sleep studies, and has been told he has MARIA ELENA and narcolepsy. He has been prescribed CPAP, but only occasionally uses it (and he did not during this preceding weekend). He uses AM adderall to help wake up. He has chronic pain and his pain provider encourages use of medical THC, and he has PRN back-up opiates. The patient denies over-use of opiates or THC during this preceding weekend. The patient did recently have labs performed by this endocrinologist, and he has an elevated ACTH (130, but has previously been 60-70 over past 7 years). Consequently, she was going to get repeat pituitary MRI (last one 2 years ago), but the patient missed his 4/29 appt w/ her.    * No surgery found *      Hospital Course:   48-year-old  male with history of ? Elevated ACTH, MARIA ELENA noncompliant with CPAP, hypertension presenting here for generalized weakness, severe fatigue, 4 extremities paresthesia.  Patient admitted for further management, patient had MRI of the brain patient is negative for acute changes, patient has persistent  hypotension upon admission which responded to the IV fluids.  Patient initially has cortisol level checked at nighttime which is normal, repeat cortisone level in the early morning shows only 3.5, highly suspect patient has adrenal insufficiency, so we attempted to do a cosyntropin stimulation test in hospital, however ACTH was given in the hospital but 30 minutes and 60 minutes of cortisone was never drawn, nevertheless, patient is started on low-dose prednisone and responded very well,  highly suspect patient may have primary adrenal insufficiency.  Patient will be referred to outpatient endocrinologist for further management.   Patient also has found to have left upper lobe nodules, need outpatient follow-up .      Goals of Care Treatment Preferences:  Code Status: Full Code      Consults:     No new Assessment & Plan notes have been filed under this hospital service since the last note was generated.  Service: Hospital Medicine    Final Active Diagnoses:    Diagnosis Date Noted POA    PRINCIPAL PROBLEM:  Fatigue [R53.83] 04/30/2024 Yes    Hypersomnolence [G47.10] 04/30/2024 Yes    RICCARDO (acute kidney injury) [N17.9] 04/30/2024 Yes    MARIA ELENA (obstructive sleep apnea) [G47.33]  Yes      Problems Resolved During this Admission:    Diagnosis Date Noted Date Resolved POA    Hypotension due to hypovolemia [E86.1] 04/30/2024 05/01/2024 Yes       Discharged Condition: stable    Disposition: Home or Self Care    Follow Up:   Follow-up Information       No, Primary Doctor. Schedule an appointment as soon as possible for a visit in 1 week(s).               [facility]. Go on 5/14/2024.    Specialty: Primary Care  Why: Hospital follow up scheduled at 9:00 a.m. on 5/14.  Contact information:  [address], [facility] [address]  [facility] [address] [phone]  Additional information:  [address]                         Patient Instructions:      Ambulatory referral/consult to Endocrinology   Standing Status:  Future   Referral Priority: Routine Referral Type: Consultation   Requested Specialty: Endocrinology   Number of Visits Requested: 1     CT Chest Abdomen Pelvis W W/O Contrast (XPD)    Result Date: 5/1/2024  CMS MANDATED QUALITY DATA - CT RADIATION - 436 All CT scans at this facility utilize dose modulation, iterative reconstruction, and/or weight based dosing when appropriate to reduce radiation dose to as low as reasonably achievable. EXAMINATION: CT CHEST ABDOMEN PELVIS W W/O CONTRAST (XPD) CLINICAL HISTORY: adrenal insuffiencey; TECHNIQUE: Chest, abdomen and pelvis CT with and without IV contrast COMPARISON: None FINDINGS: The central tracheobronchial tree is patent.  There is bilateral dependent subsegmental atelectasis.  Patchy ground-glass opacities of the right upper lobe.  4 mm triangular nodular density of the right upper lobe (series 6, image 59).  Who no pleural effusions. Heart size is normal.  The thoracic aorta is normal caliber.  No enlarged mediastinal lymph nodes or mass. The liver is normal size.  No enhancing hepatic lesions.  The gallbladder and common bile duct within normal limits.  The pancreas, spleen and adrenal glands are unremarkable.  Small splenule is noted inferior to the spleen.  The kidneys are normal size.  There is no hydronephrosis or nephrolithiasis.  No renal lesions.  The ureters are normal caliber without obstructions.  The bladder is fluid filled with no focal abnormalities.  Prostate gland seminal vesicles are unremarkable.  Pelvic phleboliths are noted. Large and small bowel normal caliber.  The appendix is not identified.  There is no bowel wall thickening or inflammatory changes.  Stomach is distended with oral contrast.  No gross gastric abnormalities observed.  There is a short segment of small bowel intussusception noted in the abdomen left upper quadrant with no surrounding inflammatory changes, likely reflecting transient intussusception. The abdominal aorta is  normal caliber.  There are no enlarged intra-abdominal lymph nodes.  No mesenteric fat stranding or free fluid. The ventral abdominal wall is unremarkable.  There are degenerative changes of the spine, pelvis and hips.     1. No acute intra-abdominal abnormality observed. 2. Short-segment of presumed transient small bowel intussusception noted in the abdomen left upper quadrant. 3. 4 mm triangular nodular density in the right upper lobe.  Follow-up CT chest in 12 months recommended. 4. Patchy ground-glass opacities of the right upper lobe could reflect subsegmental atelectasis, infectious or inflammatory changes.  Correlate and consider short-term follow-up. Electronically signed by: Mehul Hou Date:    05/01/2024 Time:    08:37    Echo    Result Date: 4/30/2024    Left Ventricle: The left ventricle is normal in size. Normal wall thickness. There is normal systolic function with a visually estimated ejection fraction of 65 - 70%. There is normal diastolic function.   Right Ventricle: Normal right ventricular cavity size. Wall thickness is normal. Systolic function is normal.   Tricuspid Valve: There is mild regurgitation with a centrally directed jet.   IVC/SVC: Normal venous pressure at 3 mmHg.     MRI Brain Pituitary Without Contrast    Result Date: 4/30/2024  EXAMINATION: MRI BRAIN PITUITARY WITHOUT CONTRAST CLINICAL HISTORY: Pituitary adenoma suspected;. TECHNIQUE: Multiplanar multisequence MR imaging of the brain was performed before and after the administration of 5 mL Gadavist  intravenous contrast using the pituitary protocol. This included acquisitions showing dynamic contrast enhancement of the sella turcica in the coronal plane and a post-contrast T1-weighted sequence. COMPARISON: CT brain dated 04/30/2024 and MRI brain dated 05/09/2022 FINDINGS: Intracranial compartment: Pituitary gland is normal in height, signal, and contour. The pituitary infundibulum is midline without abnormal thickening.  The  "optic chiasm and orbits are normal.  No discrete sellar or suprasellar lesion identified. Ventricles and sulci are normal in size for age without evidence of hydrocephalus. No extra-axial blood or fluid collections. No abnormal intracranial enhancement. Normal vascular flow voids are preserved. Bone marrow signal intensity is normal. Paranasal sinuses and mastoid air cells are essentially clear.     Normal MRI of the pituitary gland Electronically signed by: Donita Urbina Date:    04/30/2024 Time:    07:23    X-Ray Chest AP Portable    Result Date: 4/30/2024  EXAMINATION: XR CHEST AP PORTABLE CLINICAL HISTORY: Provided history is "  Hypotension, unspecified".  Additional history is leukocytosis.  No physician note available in the chart for additional history at the time of dictation. TECHNIQUE: One view of the chest. COMPARISON: None. FINDINGS: Cardiac wires overlie the chest.  Cardiomediastinal silhouette is not enlarged.  Coarse perihilar interstitial lung markings and patchy increased ground-glass/airspace attenuation in the right mid/upper lung.  Unclear if this finding is exaggerated by portable technique and soft tissue attenuation of the x-ray beam.  No other confluent area of consolidation.  No sizable pleural effusion.  No pneumothorax.     Coarse interstitial lung markings and questionable early airspace disease over the right upper lobe.  Suggest correlation for any clinical signs of pneumonia or aspiration.  Please note that this finding could be exaggerated by soft tissue attenuation of the x-ray beam and portable technique.  Follow-up PA and lateral views may provide improved sensitivity if there is clinical uncertainty. Electronically signed by: Agustín Flores MD Date:    04/30/2024 Time:    02:09    CT Head Without Contrast    Result Date: 4/30/2024  EXAMINATION: CT HEAD WITHOUT CONTRAST CLINICAL HISTORY: Syncope, recurrent; TECHNIQUE: Low dose axial CT images obtained throughout the head without " intravenous contrast. Sagittal and coronal reconstructions were performed. COMPARISON: MRI of the brain, 05/09/2022 FINDINGS: Intracranial compartment: Ventricles and sulci are stable in size for age without evidence of hydrocephalus. No extra-axial blood or fluid collections.  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 The brain parenchyma appears stable.  No parenchymal mass, hemorrhage, edema or major vascular distribution infarct. Skull/extracranial contents (limited evaluation): No fracture. Mastoid air cells and paranasal sinuses are essentially clear.     No acute abnormality. Electronically signed by: Reinaldo Calderon Date:    04/30/2024 Time:    00:33    MRI Cervical Spine Without Contrast    Result Date: 4/22/2024  MRI cervical spine without contrast HISTORY: Numbness in index fingers running up arm TECHNIQUE: Sagittal T1, T2, and inversion recovery images of the cervical spine were performed as well as axial T2-weighted images. FINDINGS: Sagittal images demonstrate straightening of the overall cervical lordosis. Alignment, vertebral body height, and marrow signal are maintained. There is no evidence of compression fracture or subluxation. There are anterior and posterior osteophytes of C4, C5, and C6. There is disc space narrowing of C3-4 and C6-7. The cervical spinal cord is normal in shape and signal. Soft tissues are unremarkable. Axial images demonstrate left uncinate process hypertrophy of C2-3 to reduce and mild left neural foraminal narrowing. Posterior osteophyte and left posterior lateral disc bulge of C3-4 produces left anterior thecal sac deformity with severe left neural foraminal narrowing. A broad-based posterior disc bulge of C4-5 produces anterior thecal sac deformity with mild bilateral neural foraminal narrowing. A broad-based posterior disc bulge of C5-6 produces anterior thecal sac deformity with  moderate left neural foraminal narrowing. Broad-based disc bulge of C6-7 produces moderate bilateral neural foraminal narrowing anterior thecal sac deformity. C7-T1 is unremarkable. The cervical spinal cord is normal in signal. Soft tissues are unremarkable.    1. There are anterior and posterior osteophytes of C4, C5, and C6. There is disc space narrowing of C3-4 and C6-7. 2. There is left uncinate process hypertrophy of C2-3 to reduce and mild left neural foraminal narrowing. 3. Posterior osteophyte and left posterior lateral disc bulge of C3-4 produces left anterior thecal sac deformity with severe left neural foraminal narrowing. 4. A broad-based posterior disc bulge of C4-5 produces anterior thecal sac deformity with mild bilateral neural foraminal narrowing. 5. A broad-based posterior disc bulge of C5-6 produces anterior thecal sac deformity with moderate left neural foraminal narrowing. 6. Broad-based disc bulge of C6-7 produces moderate bilateral neural foraminal narrowing anterior thecal sac deformity. Electronically Signed By: Ronald Fox MD 4/22/2024 12:07 PM CDT  - pulls last radiology orders    Significant Diagnostic Studies: Labs: CMP   Recent Labs   Lab 05/01/24  0559 05/02/24  0445    138   K 4.1 4.1    102   CO2 30* 27   GLU 96 96   BUN 9 10   CREATININE 0.9 0.8   CALCIUM 8.5* 9.0   PROT 6.2 6.8   ALBUMIN 4.0 4.4   BILITOT 0.5 0.7   ALKPHOS 46* 47*   AST 12 11   ALT 15 14   ANIONGAP 5* 9    and CBC   Recent Labs   Lab 05/01/24  0559 05/02/24  0445   WBC 5.82 7.58   HGB 13.0* 13.6*   HCT 37.1* 38.0*    230       Pending Diagnostic Studies:       Procedure Component Value Units Date/Time    EKG 12-lead [[id number]]     Order Status: Sent Lab Status: No result     EKG 12-lead [[id number]] Collected: 04/29/24 2320    Order Status: Sent Lab Status: In process Updated: 04/30/24 1029     QRS Duration 92 ms      [facility]HS QTC Calculation 459 ms     Narrative:      Test Reason :  R07.9,    Vent. Rate : 087 BPM     Atrial Rate : 087 BPM     P-R Int : 142 ms          QRS Dur : 092 ms      QT Int : 382 ms       P-R-T Axes : 059 016 051 degrees     QTc Int : 459 ms    Normal sinus rhythm  Normal ECG  When compared with ECG of 17-APR-2008 12:00,  QT has lengthened    Referred By: [email or web address]   SELF           Confirmed By:     Testosterone, free [[id number]] Collected: 04/30/24 1228    Order Status: Sent Lab Status: In process Updated: 04/30/24 1258    Specimen: Blood            Medications:  Reconciled Home Medications:      Medication List        START taking these medications      predniSONE 5 MG tablet  Commonly known as: DELTASONE  Take 1 tablet (5 mg total) by mouth once daily.  Start taking on: May 3, 2024            CONTINUE taking these medications      AUVELITY  mg Tbie  Generic drug: dextromethorphan-bupropion  Take 1 tablet by mouth 2 (two) times daily.     BELBUCA 600 mcg Film  Generic drug: buprenorphine HCL  Take 1 strip by mouth 2 (two) times daily.     dextroamphetamine-amphetamine 30 mg Tab  Take 1 tablet by mouth 2 (two) times daily.     EScitalopram oxalate 20 MG tablet  Commonly known as: LEXAPRO  Take 20 mg by mouth once daily.     oxyCODONE 10 mg Tab immediate release tablet  Commonly known as: ROXICODONE  Take 10 mg by mouth every 12 (twelve) hours.     tadalafiL 20 MG Tab  Commonly known as: CIALIS  Take 20 mg by mouth daily as needed (ED).            STOP taking these medications      amLODIPine 10 MG tablet  Commonly known as: NORVASC     propranoloL 20 MG tablet  Commonly known as: INDERAL              Indwelling Lines/Drains at time of discharge:   Lines/Drains/Airways       None                   Time spent on the discharge of patient: 35 minutes         Suzanne Baker MD  Department of Hospital Medicine  [facility]

## 2024-05-02 NOTE — PLAN OF CARE
CM scheduled f/u with prior endocrinologist Dr Cabrera. Met with pt who states that he is unable to afford a visit at this time with her as she does not accept insurance.  Amb referral for endocrinologist has been placed for pt and CM spoke with Rosanne and cancelled appointment with Dr Cabrera.  No PCP and message sent to discharge clinic who has scheduled TCC for pt in discharge clinic. When met with pt to discuss above he was in room, tearful. States that sister has told him not to return to her home so he is unsure where he will live. He is messaging her currently to see if he can stay there while he finds a place to live. SW to provide resources for pt also       05/02/24 1115   Post-Acute Status   Hospital Resources/Appts/Education Provided Appointments scheduled and added to AVS;Appointment suggestion unavailable

## 2024-05-03 LAB — ACTH PLAS-MCNC: 34 PG/ML (ref 7.2–63.3)

## 2024-05-05 LAB — BACTERIA BLD CULT: NORMAL

## 2024-05-07 LAB — TESTOST FREE SERPL-MCNC: 2.5 PG/ML (ref 6.8–21.5)

## 2024-05-20 LAB
OHS QRS DURATION: 92 MS
OHS QTC CALCULATION: 459 MS

## 2024-05-21 ENCOUNTER — TELEPHONE (OUTPATIENT)
Dept: HEPATOLOGY | Facility: HOSPITAL | Age: 49
End: 2024-05-21

## 2024-05-21 NOTE — TELEPHONE ENCOUNTER
patient has a free testosterone level is 2.5, which is quite low, I spoke with the patient at [phone]  5/21, he has a appointment with endocrinologist on 5/22 and he will address of this hormone deficiency.  He is free to access medical records through the medical record department of [facility].

## 2024-08-05 PROBLEM — N17.9 AKI (ACUTE KIDNEY INJURY): Status: RESOLVED | Noted: 2024-04-30 | Resolved: 2024-08-05

## 2025-03-28 ENCOUNTER — TELEPHONE (OUTPATIENT)
Dept: FAMILY MEDICINE | Facility: CLINIC | Age: 50
End: 2025-03-28
Payer: COMMERCIAL

## 2025-03-28 NOTE — TELEPHONE ENCOUNTER
----- Message from Dee Dee sent at 3/28/2025  2:47 PM CDT -----  Type:  Sooner Appointment RequestCaller is requesting a sooner appointment.  Caller declined first available appointment listed below.  Caller will not accept being placed on the waitlist and is requesting a message be sent to doctor.Name of Caller:pt When is the first available appointment?na Symptoms:est care Would the patient rather a call back or a response via [facility]? Call back Best Call Back Number:[phone] Additional Information: pt looking to be seen this tues or wed if possible.     Please call back to advise. Thank you.  
Left message call to schedule apt  
Oriented - self; Oriented - place; Oriented - time